# Patient Record
Sex: MALE | Race: WHITE | NOT HISPANIC OR LATINO | Employment: FULL TIME | URBAN - METROPOLITAN AREA
[De-identification: names, ages, dates, MRNs, and addresses within clinical notes are randomized per-mention and may not be internally consistent; named-entity substitution may affect disease eponyms.]

---

## 2019-07-23 ENCOUNTER — TELEPHONE (OUTPATIENT)
Dept: FAMILY MEDICINE CLINIC | Facility: CLINIC | Age: 54
End: 2019-07-23

## 2019-07-23 NOTE — TELEPHONE ENCOUNTER
Patient is needing refills on his medications and the pharmacy won't fax over his med requests to our fax number he tried giving him.  He needs his metoprolol 200 mg, his testosterone, and losartan potassium 100 mg please.  Pharmacy is [facility] in [facility].

## 2019-07-24 RX ORDER — METOPROLOL SUCCINATE 200 MG/1
200 TABLET, EXTENDED RELEASE ORAL DAILY
Qty: 30 TABLET | Refills: 2 | Status: SHIPPED | OUTPATIENT
Start: 2019-07-24 | End: 2019-11-21 | Stop reason: SDUPTHER

## 2019-07-24 RX ORDER — LOSARTAN POTASSIUM 100 MG/1
100 TABLET ORAL DAILY
Qty: 30 TABLET | Refills: 2 | Status: SHIPPED | OUTPATIENT
Start: 2019-07-24 | End: 2019-10-03 | Stop reason: SDUPTHER

## 2019-07-24 NOTE — TELEPHONE ENCOUNTER
LOV and SOTO reviewed.  The losartan and metoprolol has been sent.  Unfortunately the last OV was over 3 months ago and will need to be seen for any further testosterone prescriptions per state laws.

## 2019-08-01 ENCOUNTER — OFFICE VISIT (OUTPATIENT)
Dept: FAMILY MEDICINE CLINIC | Facility: CLINIC | Age: 54
End: 2019-08-01

## 2019-08-01 VITALS
OXYGEN SATURATION: 90 % | DIASTOLIC BLOOD PRESSURE: 82 MMHG | HEIGHT: 70 IN | TEMPERATURE: 97.9 F | WEIGHT: 315 LBS | BODY MASS INDEX: 45.1 KG/M2 | HEART RATE: 52 BPM | SYSTOLIC BLOOD PRESSURE: 140 MMHG

## 2019-08-01 DIAGNOSIS — E03.9 HYPOTHYROIDISM, UNSPECIFIED TYPE: ICD-10-CM

## 2019-08-01 DIAGNOSIS — E29.1 HYPOGONADISM MALE: ICD-10-CM

## 2019-08-01 DIAGNOSIS — I10 ESSENTIAL HYPERTENSION: Primary | ICD-10-CM

## 2019-08-01 DIAGNOSIS — F51.01 PRIMARY INSOMNIA: ICD-10-CM

## 2019-08-01 DIAGNOSIS — E66.01 OBESITY, MORBID, BMI 50 OR HIGHER (HCC): ICD-10-CM

## 2019-08-01 PROBLEM — G47.00 INSOMNIA: Status: ACTIVE | Noted: 2019-08-01

## 2019-08-01 PROBLEM — D64.9 ANEMIA: Status: ACTIVE | Noted: 2019-08-01

## 2019-08-01 PROBLEM — K21.9 GERD (GASTROESOPHAGEAL REFLUX DISEASE): Status: ACTIVE | Noted: 2019-08-01

## 2019-08-01 PROCEDURE — 99214 OFFICE O/P EST MOD 30 MIN: CPT | Performed by: INTERNAL MEDICINE

## 2019-08-01 RX ORDER — TRIAMTERENE AND HYDROCHLOROTHIAZIDE 37.5; 25 MG/1; MG/1
TABLET ORAL
COMMUNITY
Start: 2019-07-11 | End: 2019-08-01 | Stop reason: SDUPTHER

## 2019-08-01 RX ORDER — TESTOSTERONE CYPIONATE 200 MG/ML
300 INJECTION, SOLUTION INTRAMUSCULAR
Qty: 2 ML | Refills: 3 | Status: SHIPPED | OUTPATIENT
Start: 2019-08-01 | End: 2019-10-20 | Stop reason: SDUPTHER

## 2019-08-01 RX ORDER — FLUTICASONE PROPIONATE 50 MCG
2 SPRAY, SUSPENSION (ML) NASAL DAILY
Qty: 3 BOTTLE | Refills: 3 | Status: SHIPPED | OUTPATIENT
Start: 2019-08-01 | End: 2020-03-18 | Stop reason: SDUPTHER

## 2019-08-01 RX ORDER — AMLODIPINE BESYLATE 10 MG/1
10 TABLET ORAL DAILY
Qty: 90 TABLET | Refills: 1 | Status: SHIPPED | OUTPATIENT
Start: 2019-08-01 | End: 2020-02-04

## 2019-08-01 RX ORDER — TESTOSTERONE CYPIONATE 200 MG/ML
INJECTION, SOLUTION INTRAMUSCULAR
COMMUNITY
Start: 2019-06-27 | End: 2019-08-01 | Stop reason: SDUPTHER

## 2019-08-01 RX ORDER — FLUTICASONE PROPIONATE 50 MCG
2 SPRAY, SUSPENSION (ML) NASAL DAILY
COMMUNITY
End: 2019-08-01 | Stop reason: SDUPTHER

## 2019-08-01 RX ORDER — LEVOTHYROXINE SODIUM 0.1 MG/1
TABLET ORAL
COMMUNITY
Start: 2019-05-23 | End: 2019-09-12 | Stop reason: SDUPTHER

## 2019-08-01 RX ORDER — TRIAMTERENE AND HYDROCHLOROTHIAZIDE 37.5; 25 MG/1; MG/1
1 TABLET ORAL DAILY
Qty: 90 TABLET | Refills: 1 | Status: SHIPPED | OUTPATIENT
Start: 2019-08-01 | End: 2020-03-18 | Stop reason: SDUPTHER

## 2019-08-01 RX ORDER — AMLODIPINE BESYLATE 10 MG/1
TABLET ORAL
COMMUNITY
Start: 2019-07-11 | End: 2019-08-01 | Stop reason: SDUPTHER

## 2019-08-01 NOTE — PROGRESS NOTES
Subjective   Ryley Heller is a 54 y.o. male.     Chief Complaint   Patient presents with   • Hypertension   • Allergies   • Hypothyroidism   • Hypogonadism       History of Present Illness   Follow-up for her hypertension.  Currently they have been feeling well and asymptomatic without any headaches,cough, chest pain, shortness of breath, swelling, focal neurologic deficit , memory loss or syncope.  Has been taking the medications regularly and adherent with the regimen, Denies medication side effects and no significant interval events.    Follow-up for the testosterone.  Doing 1.5 ml every 3 weeks.  Has not felt different with last injection 10 days ago.  No difficulty urinating or with BM.  Last PSA April 2019.  Has been working on loosing weight and has lost 8 pounds.  Continues to take the thyroid hormones regularly.  Patient had all labs drawn 3 months ago and reported as normal/unchanged.  Has had difficulty falling asleep but once a sleep sleeps well.  No anxiety or mood issues.  The following portions of the patient's history were reviewed and updated as appropriate: allergies, current medications, past family history, past medical history, past social history, past surgical history and problem list.    Past Medical History:   Diagnosis Date   • Allergic    • Hypertension    • Hypothyroidism    • Pneumonia        Past Surgical History:   Procedure Laterality Date   • BACK SURGERY  08/2009   • SHOULDER SURGERY  10/2012       Family History   Adopted: Yes       Social History     Socioeconomic History   • Marital status: Single     Spouse name: Not on file   • Number of children: Not on file   • Years of education: Not on file   • Highest education level: Not on file   Tobacco Use   • Smoking status: Never Smoker   • Smokeless tobacco: Never Used   Substance and Sexual Activity   • Alcohol use: No     Frequency: Never   • Drug use: No       Review of Systems   Constitutional: Negative for activity change,  appetite change, unexpected weight gain and unexpected weight loss.   HENT: Negative for trouble swallowing and voice change.    Eyes: Negative for visual disturbance.   Respiratory: Negative for apnea, cough and shortness of breath.    Cardiovascular: Negative for chest pain, palpitations and leg swelling.   Gastrointestinal: Negative for abdominal pain, diarrhea, nausea, vomiting, GERD and indigestion.   Endocrine: Negative for polydipsia, polyphagia and polyuria.   Genitourinary: Negative for urinary incontinence, erectile dysfunction and urgency.   Musculoskeletal: Negative for arthralgias, back pain and myalgias.   Neurological: Negative for dizziness, tremors, weakness and light-headedness.   Hematological: Negative for adenopathy. Does not bruise/bleed easily.   Psychiatric/Behavioral: Positive for sleep disturbance. Negative for agitation and depressed mood.       Objective   Vitals:    08/01/19 1052   BP: 140/82   Pulse: 52   Temp: 97.9 °F (36.6 °C)   SpO2: 90%     Body mass index is 51.6 kg/m².  Physical Exam   Constitutional: He is oriented to person, place, and time. He appears well-developed and well-nourished.   HENT:   Head: Normocephalic and atraumatic.   Right Ear: External ear normal.   Left Ear: External ear normal.   Nose: Nose normal.   Mouth/Throat: Oropharynx is clear and moist.   Eyes: Conjunctivae and EOM are normal. Pupils are equal, round, and reactive to light.   Neck: Normal range of motion. Neck supple. No tracheal deviation present. No thyromegaly present.   Cardiovascular: Normal rate, regular rhythm and normal heart sounds. Exam reveals no gallop and no friction rub.   No murmur heard.  Pulmonary/Chest: Effort normal and breath sounds normal. No respiratory distress. He exhibits no tenderness.   Abdominal: Soft. Bowel sounds are normal. He exhibits no distension. There is no tenderness.   obese   Musculoskeletal: Normal range of motion. He exhibits no edema or tenderness.    Lymphadenopathy:     He has no cervical adenopathy.   Neurological: He is alert and oriented to person, place, and time. He displays normal reflexes. No cranial nerve deficit or sensory deficit. Coordination normal.   Skin: Skin is warm and dry.   Psychiatric: He has a normal mood and affect. His behavior is normal. Judgment and thought content normal.   Nursing note and vitals reviewed.      No results found for this or any previous visit (from the past 2016 hour(s)).  Assessment/Plan   Ryley was seen today for hypertension, allergies, hypothyroidism and hypogonadism.    Diagnoses and all orders for this visit:    Essential hypertension    Hypogonadism male    Hypothyroidism, unspecified type    Primary insomnia    Obesity, morbid, BMI 50 or higher (CMS/[facility])    Other orders  -     amLODIPine (NORVASC) 10 MG tablet; Take 1 tablet by mouth Daily.  -     triamterene-hydrochlorothiazide (MAXZIDE-25) 37.5-25 MG per tablet; Take 1 tablet by mouth Daily.  -     fluticasone (FLONASE) 50 MCG/ACT nasal spray; 2 sprays into the nostril(s) as directed by provider Daily.  -     Testosterone Cypionate (DEPOTESTOTERONE CYPIONATE) 200 MG/ML injection; Inject 1.5 mL into the appropriate muscle as directed by prescriber Every 21 (Twenty-One) Days.     Discussed the sleeping and recommend trying OTC melatonin first.  Continue the other  Medications and controlled substance agreement reviewed and completed.  SOTO run and reviewed.  Risks of the medication include but are not limited to abuse, rage, anxiety, behavior issues, etc.

## 2019-08-01 NOTE — PATIENT INSTRUCTIONS
Calorie Counting for Weight Loss  Calories are units of energy. Your body needs a certain amount of calories from food to keep you going throughout the day. When you eat more calories than your body needs, your body stores the extra calories as fat. When you eat fewer calories than your body needs, your body burns fat to get the energy it needs.  Calorie counting means keeping track of how many calories you eat and drink each day. Calorie counting can be helpful if you need to lose weight. If you make sure to eat fewer calories than your body needs, you should lose weight. Ask your health care provider what a healthy weight is for you.  For calorie counting to work, you will need to eat the right number of calories in a day in order to lose a healthy amount of weight per week. A dietitian can help you determine how many calories you need in a day and will give you suggestions on how to reach your calorie goal.  · A healthy amount of weight to lose per week is usually 1-2 lb (0.5-0.9 kg). This usually means that your daily calorie intake should be reduced by 500-750 calories.  · Eating 1,200 - 1,500 calories per day can help most women lose weight.  · Eating 1,500 - 1,800 calories per day can help most men lose weight.  What is my plan?  My goal is to have __________ calories per day.  If I have this many calories per day, I should lose around __________ pounds per week.  What do I need to know about calorie counting?  In order to meet your daily calorie goal, you will need to:  · Find out how many calories are in each food you would like to eat. Try to do this before you eat.  · Decide how much of the food you plan to eat.  · Write down what you ate and how many calories it had. Doing this is called keeping a food log.  To successfully lose weight, it is important to balance calorie counting with a healthy lifestyle that includes regular activity. Aim for 150 minutes of moderate exercise (such as walking) or 75  minutes of vigorous exercise (such as running) each week.  Where do I find calorie information?    The number of calories in a food can be found on a Nutrition Facts label. If a food does not have a Nutrition Facts label, try to look up the calories online or ask your dietitian for help.  Remember that calories are listed per serving. If you choose to have more than one serving of a food, you will have to multiply the calories per serving by the amount of servings you plan to eat. For example, the label on a package of bread might say that a serving size is 1 slice and that there are 90 calories in a serving. If you eat 1 slice, you will have eaten 90 calories. If you eat 2 slices, you will have eaten 180 calories.  How do I keep a food log?  Immediately after each meal, record the following information in your food log:  · What you ate. Don't forget to include toppings, sauces, and other extras on the food.  · How much you ate. This can be measured in cups, ounces, or number of items.  · How many calories each food and drink had.  · The total number of calories in the meal.  Keep your food log near you, such as in a small notebook in your pocket, or use a mobile shirley or website. Some programs will calculate calories for you and show you how many calories you have left for the day to meet your goal.  What are some calorie counting tips?    · Use your calories on foods and drinks that will fill you up and not leave you hungry:  ? Some examples of foods that fill you up are nuts and nut butters, vegetables, lean proteins, and high-fiber foods like whole grains. High-fiber foods are foods with more than 5 g fiber per serving.  ? Drinks such as sodas, specialty coffee drinks, alcohol, and juices have a lot of calories, yet do not fill you up.  · Eat nutritious foods and avoid empty calories. Empty calories are calories you get from foods or beverages that do not have many vitamins or protein, such as candy, sweets, and  "soda. It is better to have a nutritious high-calorie food (such as an avocado) than a food with few nutrients (such as a bag of chips).  · Know how many calories are in the foods you eat most often. This will help you calculate calorie counts faster.  · Pay attention to calories in drinks. Low-calorie drinks include water and unsweetened drinks.  · Pay attention to nutrition labels for \"low fat\" or \"fat free\" foods. These foods sometimes have the same amount of calories or more calories than the full fat versions. They also often have added sugar, starch, or salt, to make up for flavor that was removed with the fat.  · Find a way of tracking calories that works for you. Get creative. Try different apps or programs if writing down calories does not work for you.  What are some portion control tips?  · Know how many calories are in a serving. This will help you know how many servings of a certain food you can have.  · Use a measuring cup to measure serving sizes. You could also try weighing out portions on a kitchen scale. With time, you will be able to estimate serving sizes for some foods.  · Take some time to put servings of different foods on your favorite plates, bowls, and cups so you know what a serving looks like.  · Try not to eat straight from a bag or box. Doing this can lead to overeating. Put the amount you would like to eat in a cup or on a plate to make sure you are eating the right portion.  · Use smaller plates, glasses, and bowls to prevent overeating.  · Try not to multitask (for example, watch TV or use your computer) while eating. If it is time to eat, sit down at a table and enjoy your food. This will help you to know when you are full. It will also help you to be aware of what you are eating and how much you are eating.  What are tips for following this plan?  Reading food labels  · Check the calorie count compared to the serving size. The serving size may be smaller than what you are used to " eating.  · Check the source of the calories. Make sure the food you are eating is high in vitamins and protein and low in saturated and trans fats.  Shopping  · Read nutrition labels while you shop. This will help you make healthy decisions before you decide to purchase your food.  · Make a grocery list and stick to it.  Cooking  · Try to cook your favorite foods in a healthier way. For example, try baking instead of frying.  · Use low-fat dairy products.  Meal planning  · Use more fruits and vegetables. Half of your plate should be fruits and vegetables.  · Include lean proteins like poultry and fish.  How do I count calories when eating out?  · Ask for smaller portion sizes.  · Consider sharing an entree and sides instead of getting your own entree.  · If you get your own entree, eat only half. Ask for a box at the beginning of your meal and put the rest of your entree in it so you are not tempted to eat it.  · If calories are listed on the menu, choose the lower calorie options.  · Choose dishes that include vegetables, fruits, whole grains, low-fat dairy products, and lean protein.  · Choose items that are boiled, broiled, grilled, or steamed. Stay away from items that are buttered, battered, fried, or served with cream sauce. Items labeled “crispy” are usually fried, unless stated otherwise.  · Choose water, low-fat milk, unsweetened iced tea, or other drinks without added sugar. If you want an alcoholic beverage, choose a lower calorie option such as a glass of wine or light beer.  · Ask for dressings, sauces, and syrups on the side. These are usually high in calories, so you should limit the amount you eat.  · If you want a salad, choose a garden salad and ask for grilled meats. Avoid extra toppings like vega, cheese, or fried items. Ask for the dressing on the side, or ask for olive oil and vinegar or lemon to use as dressing.  · Estimate how many servings of a food you are given. For example, a serving of  cooked rice is ½ cup or about the size of half a baseball. Knowing serving sizes will help you be aware of how much food you are eating at restaurants. The list below tells you how big or small some common portion sizes are based on everyday objects:  ? 1 oz--4 stacked dice.  ? 3 oz--1 deck of cards.  ? 1 tsp--1 die.  ? 1 Tbsp--½ a ping-pong ball.  ? 2 Tbsp--1 ping-pong ball.  ? ½ cup--½ baseball.  ? 1 cup--1 baseball.  Summary  · Calorie counting means keeping track of how many calories you eat and drink each day. If you eat fewer calories than your body needs, you should lose weight.  · A healthy amount of weight to lose per week is usually 1-2 lb (0.5-0.9 kg). This usually means reducing your daily calorie intake by 500-750 calories.  · The number of calories in a food can be found on a Nutrition Facts label. If a food does not have a Nutrition Facts label, try to look up the calories online or ask your dietitian for help.  · Use your calories on foods and drinks that will fill you up, and not on foods and drinks that will leave you hungry.  · Use smaller plates, glasses, and bowls to prevent overeating.  This information is not intended to replace advice given to you by your health care provider. Make sure you discuss any questions you have with your health care provider.  Document Released: 12/18/2006 Document Revised: 11/17/2017 Document Reviewed: 11/17/2017  [facility] Interactive Patient Education © 2019 [employer] [facility] [employer]  Obesity, Adult  Obesity is having too much body fat. If you have a BMI of 30 or more, you are obese. BMI is a number that explains how much body fat you have. Obesity is often caused by taking in (consuming) more calories than your body uses.  Obesity can cause serious health problems. Changing your lifestyle can help to treat obesity.  Follow these instructions at home:  Eating and drinking    · Follow advice from your doctor about what to eat and drink. Your doctor may tell you to:  ? Cut down  on (limit) fast foods, sweets, and processed snack foods.  ? Choose low-fat options. For example, choose low-fat milk instead of whole milk.  ? Eat 5 or more servings of fruits or vegetables every day.  ? Eat at home more often. This gives you more control over what you eat.  ? Choose healthy foods when you eat out.  ? Learn what a healthy portion size is. A portion size is the amount of a certain food that is healthy for you to eat at one time. This is different for each person.  ? Keep low-fat snacks available.  ? Avoid sugary drinks. These include soda, fruit juice, iced tea that is sweetened with sugar, and flavored milk.  ? Eat a healthy breakfast.  · Drink enough water to keep your pee (urine) clear or pale yellow.  · Do not go without eating for long periods of time (do not fast).  · Do not go on popular or trendy diets (fad diets).  Physical Activity  · Exercise often, as told by your doctor. Ask your doctor:  ? What types of exercise are safe for you.  ? How often you should exercise.  · Warm up and stretch before being active.  · Do slow stretching after being active (cool down).  · Rest between times of being active.  Lifestyle  · Limit how much time you spend in front of your TV, computer, or video game system (be less sedentary).  · Find ways to reward yourself that do not involve food.  · Limit alcohol intake to no more than 1 drink a day for nonpregnant women and 2 drinks a day for men. One drink equals 12 oz of beer, 5 oz of wine, or 1½ oz of hard liquor.  General instructions  · Keep a weight loss journal. This can help you keep track of:  ? The food that you eat.  ? The exercise that you do.  · Take over-the-counter and prescription medicines only as told by your doctor.  · Take vitamins and supplements only as told by your doctor.  · Think about joining a support group. Your doctor may be able to help with this.  · Keep all follow-up visits as told by your doctor. This is important.  Contact a  doctor if:  · You cannot meet your weight loss goal after you have changed your diet and lifestyle for 6 weeks.  This information is not intended to replace advice given to you by your health care provider. Make sure you discuss any questions you have with your health care provider.  Document Released: 03/11/2013 Document Revised: 05/25/2017 Document Reviewed: 10/05/2016  [employer] Interactive Patient Education © 2019 [employer]

## 2019-09-10 ENCOUNTER — TELEPHONE (OUTPATIENT)
Dept: FAMILY MEDICINE CLINIC | Facility: CLINIC | Age: 54
End: 2019-09-10

## 2019-09-10 NOTE — TELEPHONE ENCOUNTER
Needs his Levothyroxine 100MCG sent to [facility] listed in chart, he states that [facility] has been sending a refill request

## 2019-09-12 DIAGNOSIS — E03.9 HYPOTHYROIDISM, UNSPECIFIED TYPE: Primary | ICD-10-CM

## 2019-09-12 RX ORDER — LEVOTHYROXINE SODIUM 0.1 MG/1
100 TABLET ORAL DAILY
Qty: 30 TABLET | Refills: 3 | Status: SHIPPED | OUTPATIENT
Start: 2019-09-12 | End: 2020-01-02

## 2019-10-03 DIAGNOSIS — I10 ESSENTIAL HYPERTENSION: Primary | ICD-10-CM

## 2019-10-03 RX ORDER — LOSARTAN POTASSIUM 100 MG/1
100 TABLET ORAL DAILY
Qty: 30 TABLET | Refills: 0 | Status: SHIPPED | OUTPATIENT
Start: 2019-10-03 | End: 2020-03-18 | Stop reason: SDUPTHER

## 2019-10-21 RX ORDER — TESTOSTERONE CYPIONATE 200 MG/ML
INJECTION, SOLUTION INTRAMUSCULAR
Qty: 2 ML | Refills: 3 | Status: SHIPPED | OUTPATIENT
Start: 2019-10-21 | End: 2020-01-27

## 2019-11-22 RX ORDER — METOPROLOL SUCCINATE 200 MG/1
TABLET, EXTENDED RELEASE ORAL
Qty: 30 TABLET | Refills: 3 | Status: SHIPPED | OUTPATIENT
Start: 2019-11-22 | End: 2020-03-26

## 2020-01-01 DIAGNOSIS — E03.9 HYPOTHYROIDISM, UNSPECIFIED TYPE: ICD-10-CM

## 2020-01-02 RX ORDER — LEVOTHYROXINE SODIUM 0.1 MG/1
TABLET ORAL
Qty: 30 TABLET | Refills: 0 | Status: SHIPPED | OUTPATIENT
Start: 2020-01-02 | End: 2020-02-04

## 2020-01-17 RX ORDER — TESTOSTERONE CYPIONATE 200 MG/ML
INJECTION, SOLUTION INTRAMUSCULAR
Qty: 2 ML | Refills: 2 | OUTPATIENT
Start: 2020-01-17

## 2020-01-27 DIAGNOSIS — E29.1 HYPOGONADISM MALE: Primary | ICD-10-CM

## 2020-01-27 RX ORDER — TESTOSTERONE CYPIONATE 200 MG/ML
INJECTION, SOLUTION INTRAMUSCULAR
Qty: 2 ML | Refills: 1 | Status: SHIPPED | OUTPATIENT
Start: 2020-01-27 | End: 2020-03-18 | Stop reason: SDUPTHER

## 2020-02-03 DIAGNOSIS — E03.9 HYPOTHYROIDISM, UNSPECIFIED TYPE: ICD-10-CM

## 2020-02-04 RX ORDER — LEVOTHYROXINE SODIUM 0.1 MG/1
TABLET ORAL
Qty: 30 TABLET | Refills: 0 | Status: SHIPPED | OUTPATIENT
Start: 2020-02-04 | End: 2020-03-04

## 2020-02-04 RX ORDER — AMLODIPINE BESYLATE 10 MG/1
TABLET ORAL
Qty: 30 TABLET | Refills: 0 | Status: SHIPPED | OUTPATIENT
Start: 2020-02-04 | End: 2020-03-04

## 2020-03-03 DIAGNOSIS — E03.9 HYPOTHYROIDISM, UNSPECIFIED TYPE: ICD-10-CM

## 2020-03-04 RX ORDER — AMLODIPINE BESYLATE 10 MG/1
TABLET ORAL
Qty: 30 TABLET | Refills: 0 | Status: SHIPPED | OUTPATIENT
Start: 2020-03-04 | End: 2020-03-26

## 2020-03-04 RX ORDER — LEVOTHYROXINE SODIUM 0.1 MG/1
100 TABLET ORAL DAILY
Qty: 30 TABLET | Refills: 0 | Status: SHIPPED | OUTPATIENT
Start: 2020-03-04 | End: 2020-03-26

## 2020-03-05 DIAGNOSIS — E29.1 HYPOGONADISM MALE: ICD-10-CM

## 2020-03-05 RX ORDER — TRIAMTERENE AND HYDROCHLOROTHIAZIDE 37.5; 25 MG/1; MG/1
1 TABLET ORAL DAILY
Qty: 30 TABLET | Refills: 0 | OUTPATIENT
Start: 2020-03-05

## 2020-03-05 RX ORDER — TESTOSTERONE CYPIONATE 200 MG/ML
INJECTION, SOLUTION INTRAMUSCULAR
Qty: 2 ML | Refills: 0 | OUTPATIENT
Start: 2020-03-05

## 2020-03-06 ENCOUNTER — TELEPHONE (OUTPATIENT)
Dept: FAMILY MEDICINE CLINIC | Facility: CLINIC | Age: 55
End: 2020-03-06

## 2020-03-06 NOTE — TELEPHONE ENCOUNTER
Attempted to call patient to get scheduled to come in for follow up to get his testosterone levels checked so he can get med refills. He has not been seen since August.

## 2020-03-18 ENCOUNTER — OFFICE VISIT (OUTPATIENT)
Dept: FAMILY MEDICINE CLINIC | Facility: CLINIC | Age: 55
End: 2020-03-18

## 2020-03-18 VITALS
BODY MASS INDEX: 45.1 KG/M2 | TEMPERATURE: 97.7 F | DIASTOLIC BLOOD PRESSURE: 82 MMHG | HEIGHT: 70 IN | WEIGHT: 315 LBS | HEART RATE: 56 BPM | SYSTOLIC BLOOD PRESSURE: 138 MMHG | OXYGEN SATURATION: 96 %

## 2020-03-18 DIAGNOSIS — E03.9 HYPOTHYROIDISM, UNSPECIFIED TYPE: ICD-10-CM

## 2020-03-18 DIAGNOSIS — F51.01 PRIMARY INSOMNIA: ICD-10-CM

## 2020-03-18 DIAGNOSIS — E29.1 HYPOGONADISM MALE: ICD-10-CM

## 2020-03-18 DIAGNOSIS — D64.9 ANEMIA, UNSPECIFIED TYPE: ICD-10-CM

## 2020-03-18 DIAGNOSIS — I10 ESSENTIAL HYPERTENSION: Primary | ICD-10-CM

## 2020-03-18 PROCEDURE — 99214 OFFICE O/P EST MOD 30 MIN: CPT | Performed by: INTERNAL MEDICINE

## 2020-03-18 RX ORDER — TRIAMTERENE AND HYDROCHLOROTHIAZIDE 37.5; 25 MG/1; MG/1
1 TABLET ORAL DAILY
Qty: 90 TABLET | Refills: 1 | Status: SHIPPED | OUTPATIENT
Start: 2020-03-18 | End: 2020-09-21

## 2020-03-18 RX ORDER — FLUTICASONE PROPIONATE 50 MCG
2 SPRAY, SUSPENSION (ML) NASAL DAILY
Qty: 3 BOTTLE | Refills: 3 | Status: SHIPPED | OUTPATIENT
Start: 2020-03-18 | End: 2020-12-29

## 2020-03-18 RX ORDER — DOXEPIN HYDROCHLORIDE 10 MG/1
10 CAPSULE ORAL NIGHTLY
Qty: 30 CAPSULE | Refills: 3 | Status: SHIPPED | OUTPATIENT
Start: 2020-03-18 | End: 2020-07-23

## 2020-03-18 RX ORDER — TESTOSTERONE CYPIONATE 200 MG/ML
400 INJECTION, SOLUTION INTRAMUSCULAR
Qty: 2 ML | Refills: 3 | Status: SHIPPED | OUTPATIENT
Start: 2020-03-18 | End: 2020-07-02

## 2020-03-18 RX ORDER — LOSARTAN POTASSIUM 100 MG/1
100 TABLET ORAL DAILY
Qty: 90 TABLET | Refills: 1 | Status: SHIPPED | OUTPATIENT
Start: 2020-03-18 | End: 2020-09-21

## 2020-03-18 NOTE — PROGRESS NOTES
Subjective   Ryley Heller is a 54 y.o. male.     Chief Complaint   Patient presents with   • Hypertension       History of Present Illness   Follow-up for her hypertension.  Currently they have been feeling well and asymptomatic without any headaches,cough, chest pain, shortness of breath, swelling, focal neurologic deficit , memory loss or syncope.  Has been taking the medications regularly and adherent with the regimen, Denies medication side effects and no significant interval events.    Follow-up for the testosterone.  Doing 2 ml every 3 weeks.  No difficulty urinating or with BM.  Last PSA April 2019.  Has had difficulty sleeping and has been using the melatonin 12 mg that helps a little but still not able to fall asleep for 30 min -2 hours then wakes up after 3 hours and cannot go back to sleep for several hours.  The following portions of the patient's history were reviewed and updated as appropriate: allergies, current medications, past family history, past medical history, past social history, past surgical history and problem list.    Depression Screen:  No flowsheet data found.    Past Medical History:   Diagnosis Date   • Allergic    • Allergic rhinitis    • Anemia    • BMI 50.0-59.9, adult (CMS/HCC)    • Elevated alkaline phosphatase level    • Encounter for immunization    • Fatigue    • Fever    • Gastritis    • GERD (gastroesophageal reflux disease)    • Hypertension    • Hypogonadism male    • Hypothyroidism    • Morbid obesity (CMS/HCC)    • Pneumonia    • Visit for annual health examination        Past Surgical History:   Procedure Laterality Date   • BACK SURGERY  08/2009   • COLONOSCOPY  10/2014    diverticulosis, internal hemorrhoids and polyps repeat in 3 years   • LAMINECTOMY  2011    Decompress, Facetectomy, Foraminotomy Lumbar Seg • L4-S1    • SHOULDER SURGERY  10/2012       Family History   Adopted: Yes       Social History     Socioeconomic History   • Marital status: Single     Spouse  name: Not on file   • Number of children: Not on file   • Years of education: Not on file   • Highest education level: Not on file   Tobacco Use   • Smoking status: Never Smoker   • Smokeless tobacco: Never Used   Substance and Sexual Activity   • Alcohol use: No     Frequency: Never   • Drug use: No       Current Outpatient Medications   Medication Sig Dispense Refill   • amLODIPine (NORVASC) 10 MG tablet TAKE ONE TABLET BY MOUTH DAILY 30 tablet 0   • fluticasone (FLONASE) 50 MCG/ACT nasal spray 2 sprays into the nostril(s) as directed by provider Daily. 3 bottle 3   • levothyroxine (SYNTHROID, LEVOTHROID) 100 MCG tablet Take 1 tablet by mouth Daily. Take 1 tablet by mouth daily. Take on empty stomach. 30 tablet 0   • losartan (Cozaar) 100 MG tablet Take 1 tablet by mouth Daily. 90 tablet 1   • metoprolol succinate XL (TOPROL-XL) 200 MG 24 hr tablet TAKE 1 (ONE) TABLET BY MOUTH DAILY 30 tablet 3   • Testosterone Cypionate (DEPOTESTOTERONE CYPIONATE) 200 MG/ML injection Inject 2 mL into the appropriate muscle as directed by prescriber Every 21 (Twenty-One) Days. 2 mL 3   • triamterene-hydrochlorothiazide (MAXZIDE-25) 37.5-25 MG per tablet Take 1 tablet by mouth Daily. 90 tablet 1   • doxepin (SINEquan) 10 MG capsule Take 1 capsule by mouth Every Night. 30 capsule 3     No current facility-administered medications for this visit.        Review of Systems   Constitutional: Negative for activity change, appetite change, fatigue, fever, unexpected weight gain and unexpected weight loss.   HENT: Negative for nosebleeds, rhinorrhea, trouble swallowing and voice change.    Eyes: Negative for visual disturbance.   Respiratory: Negative for cough, chest tightness, shortness of breath and wheezing.    Cardiovascular: Negative for chest pain, palpitations and leg swelling.   Gastrointestinal: Negative for abdominal pain, blood in stool, constipation, diarrhea, nausea, vomiting, GERD and indigestion.   Genitourinary: Negative  "for dysuria, frequency and hematuria.   Musculoskeletal: Negative for arthralgias, back pain and myalgias.   Skin: Negative for rash and bruise.   Neurological: Negative for dizziness, tremors, weakness, light-headedness, numbness, headache and memory problem.   Hematological: Negative for adenopathy. Does not bruise/bleed easily.   Psychiatric/Behavioral: Positive for sleep disturbance. Negative for depressed mood. The patient is not nervous/anxious.        Objective   /82 (BP Location: Left arm, Patient Position: Sitting, Cuff Size: Large Adult)   Pulse 56   Temp 97.7 °F (36.5 °C) (Oral)   Ht 177.8 cm (70\")   Wt (!) 164 kg (362 lb)   SpO2 96%   BMI 51.94 kg/m²     Physical Exam   Constitutional: He is oriented to person, place, and time. He appears well-developed and well-nourished. No distress.   HENT:   Head: Normocephalic and atraumatic.   Right Ear: External ear normal.   Left Ear: External ear normal.   Nose: Nose normal.   Mouth/Throat: Oropharynx is clear and moist.   Eyes: Pupils are equal, round, and reactive to light. Conjunctivae and EOM are normal.   Neck: Normal range of motion. Neck supple. No tracheal deviation present. No thyromegaly present.   Cardiovascular: Normal rate, regular rhythm, normal heart sounds and intact distal pulses. Exam reveals no gallop and no friction rub.   No murmur heard.  Pulmonary/Chest: Effort normal and breath sounds normal. No respiratory distress.   Abdominal: Soft. Bowel sounds are normal. He exhibits no mass. There is no tenderness. There is no guarding.   Musculoskeletal: Normal range of motion. He exhibits no edema.   Lymphadenopathy:     He has no cervical adenopathy.   Neurological: He is alert and oriented to person, place, and time. He displays normal reflexes. He exhibits normal muscle tone.   Skin: Skin is warm and dry. Capillary refill takes less than 2 seconds. No rash noted. He is not diaphoretic.   Psychiatric: He has a normal mood and affect. " His behavior is normal. Judgment and thought content normal.   Nursing note and vitals reviewed.      No results found for this or any previous visit (from the past 2016 hour(s)).  Assessment/Plan   Ryley was seen today for hypertension.    Diagnoses and all orders for this visit:    Essential hypertension  -     triamterene-hydrochlorothiazide (MAXZIDE-25) 37.5-25 MG per tablet; Take 1 tablet by mouth Daily.  -     losartan (Cozaar) 100 MG tablet; Take 1 tablet by mouth Daily.  -     Comprehensive Metabolic Panel; Future  -     Lipid Panel; Future    Hypothyroidism, unspecified type  -     TSH; Future  -     T4, Free; Future    Hypogonadism male  -     Testosterone Cypionate (DEPOTESTOTERONE CYPIONATE) 200 MG/ML injection; Inject 2 mL into the appropriate muscle as directed by prescriber Every 21 (Twenty-One) Days.  -     CBC & Differential; Future  -     PSA Screen; Future    Primary insomnia  -     doxepin (SINEquan) 10 MG capsule; Take 1 capsule by mouth Every Night.    Anemia, unspecified type  -     CBC & Differential; Future    Other orders  -     fluticasone (FLONASE) 50 MCG/ACT nasal spray; 2 sprays into the nostril(s) as directed by provider Daily.      Patient blood pressure is now currently doing well.  Continue current medications unchanged we will check the CMP and lipid panel today.  Patient with  Hypothyroidism but has not had levels checked in quite a long time we will schedule for his TSH and T4.  History of hypogonadism will be due in April for his PSA therefore we will do all of his blood tests at the same time including CBC levels.  Patient with new onset difficulties with insomnia including both initiation and maintenance issues will initiate doxepin 10 mg once nightly.

## 2020-03-26 DIAGNOSIS — E03.9 HYPOTHYROIDISM, UNSPECIFIED TYPE: ICD-10-CM

## 2020-03-26 RX ORDER — AMLODIPINE BESYLATE 10 MG/1
TABLET ORAL
Qty: 30 TABLET | Refills: 0 | Status: SHIPPED | OUTPATIENT
Start: 2020-03-26 | End: 2020-05-04

## 2020-03-26 RX ORDER — LEVOTHYROXINE SODIUM 0.1 MG/1
TABLET ORAL
Qty: 30 TABLET | Refills: 0 | Status: SHIPPED | OUTPATIENT
Start: 2020-03-26 | End: 2020-05-04

## 2020-03-26 RX ORDER — METOPROLOL SUCCINATE 200 MG/1
TABLET, EXTENDED RELEASE ORAL
Qty: 30 TABLET | Refills: 2 | Status: SHIPPED | OUTPATIENT
Start: 2020-03-26 | End: 2020-07-06

## 2020-04-20 ENCOUNTER — RESULTS ENCOUNTER (OUTPATIENT)
Dept: FAMILY MEDICINE CLINIC | Facility: CLINIC | Age: 55
End: 2020-04-20

## 2020-04-20 DIAGNOSIS — E29.1 HYPOGONADISM MALE: ICD-10-CM

## 2020-04-20 DIAGNOSIS — E03.9 HYPOTHYROIDISM, UNSPECIFIED TYPE: ICD-10-CM

## 2020-04-20 DIAGNOSIS — I10 ESSENTIAL HYPERTENSION: ICD-10-CM

## 2020-04-20 DIAGNOSIS — D64.9 ANEMIA, UNSPECIFIED TYPE: ICD-10-CM

## 2020-04-21 LAB
ALBUMIN SERPL-MCNC: 4 G/DL (ref 3.5–5.2)
ALBUMIN/GLOB SERPL: 1.3 G/DL
ALP SERPL-CCNC: 135 U/L (ref 39–117)
ALT SERPL-CCNC: 21 U/L (ref 1–41)
AST SERPL-CCNC: 12 U/L (ref 1–40)
BASOPHILS # BLD AUTO: ABNORMAL 10*3/UL
BASOPHILS # BLD MANUAL: 0.06 10*3/MM3 (ref 0–0.2)
BASOPHILS NFR BLD MANUAL: 1 % (ref 0–1.5)
BILIRUB SERPL-MCNC: 1 MG/DL (ref 0.2–1.2)
BUN SERPL-MCNC: 21 MG/DL (ref 6–20)
BUN/CREAT SERPL: 13.5 (ref 7–25)
CALCIUM SERPL-MCNC: 9 MG/DL (ref 8.6–10.5)
CHLORIDE SERPL-SCNC: 97 MMOL/L (ref 98–107)
CHOLEST SERPL-MCNC: 130 MG/DL (ref 0–200)
CO2 SERPL-SCNC: 27.7 MMOL/L (ref 22–29)
CREAT SERPL-MCNC: 1.56 MG/DL (ref 0.76–1.27)
DIFFERENTIAL COMMENT: NORMAL
EOSINOPHIL # BLD AUTO: ABNORMAL 10*3/UL
EOSINOPHIL # BLD MANUAL: 0.06 10*3/MM3 (ref 0–0.4)
EOSINOPHIL NFR BLD AUTO: ABNORMAL %
EOSINOPHIL NFR BLD MANUAL: 1 % (ref 0.3–6.2)
ERYTHROCYTE [DISTWIDTH] IN BLOOD BY AUTOMATED COUNT: 13.1 % (ref 12.3–15.4)
GLOBULIN SER CALC-MCNC: 3 GM/DL
GLUCOSE SERPL-MCNC: 113 MG/DL (ref 65–99)
HCT VFR BLD AUTO: 52 % (ref 37.5–51)
HDLC SERPL-MCNC: 35 MG/DL (ref 40–60)
HGB BLD-MCNC: 17.4 G/DL (ref 13–17.7)
LDLC SERPL CALC-MCNC: 74 MG/DL (ref 0–100)
LYMPHOCYTES # BLD AUTO: ABNORMAL 10*3/UL
LYMPHOCYTES # BLD MANUAL: 1.8 10*3/MM3 (ref 0.7–3.1)
LYMPHOCYTES NFR BLD AUTO: ABNORMAL %
LYMPHOCYTES NFR BLD MANUAL: 30.3 % (ref 19.6–45.3)
MCH RBC QN AUTO: 29.5 PG (ref 26.6–33)
MCHC RBC AUTO-ENTMCNC: 33.5 G/DL (ref 31.5–35.7)
MCV RBC AUTO: 88.1 FL (ref 79–97)
MONOCYTES # BLD MANUAL: 0.36 10*3/MM3 (ref 0.1–0.9)
MONOCYTES NFR BLD AUTO: ABNORMAL %
MONOCYTES NFR BLD MANUAL: 6.1 % (ref 5–12)
NEUTROPHILS # BLD MANUAL: 3.59 10*3/MM3 (ref 1.7–7)
NEUTROPHILS NFR BLD AUTO: ABNORMAL %
NEUTROPHILS NFR BLD MANUAL: 60.6 % (ref 42.7–76)
PLATELET # BLD AUTO: 124 10*3/MM3 (ref 140–450)
PLATELET BLD QL SMEAR: NORMAL
POTASSIUM SERPL-SCNC: 5.1 MMOL/L (ref 3.5–5.2)
PROT SERPL-MCNC: 7 G/DL (ref 6–8.5)
PSA SERPL-MCNC: 0.25 NG/ML (ref 0–4)
RBC # BLD AUTO: 5.9 10*6/MM3 (ref 4.14–5.8)
RBC MORPH BLD: NORMAL
SODIUM SERPL-SCNC: 137 MMOL/L (ref 136–145)
T4 FREE SERPL-MCNC: 0.91 NG/DL (ref 0.93–1.7)
TRIGL SERPL-MCNC: 104 MG/DL (ref 0–150)
TSH SERPL DL<=0.005 MIU/L-ACNC: 3.18 UIU/ML (ref 0.27–4.2)
VLDLC SERPL CALC-MCNC: 20.8 MG/DL (ref 5–40)
WBC # BLD AUTO: 5.93 10*3/MM3 (ref 3.4–10.8)

## 2020-05-02 DIAGNOSIS — E03.9 HYPOTHYROIDISM, UNSPECIFIED TYPE: ICD-10-CM

## 2020-05-04 RX ORDER — AMLODIPINE BESYLATE 10 MG/1
TABLET ORAL
Qty: 30 TABLET | Refills: 0 | Status: SHIPPED | OUTPATIENT
Start: 2020-05-04 | End: 2020-06-08

## 2020-05-04 RX ORDER — LEVOTHYROXINE SODIUM 0.1 MG/1
TABLET ORAL
Qty: 30 TABLET | Refills: 0 | Status: SHIPPED | OUTPATIENT
Start: 2020-05-04 | End: 2020-06-08

## 2020-06-05 DIAGNOSIS — E03.9 HYPOTHYROIDISM, UNSPECIFIED TYPE: ICD-10-CM

## 2020-06-08 RX ORDER — LEVOTHYROXINE SODIUM 0.1 MG/1
TABLET ORAL
Qty: 30 TABLET | Refills: 0 | Status: SHIPPED | OUTPATIENT
Start: 2020-06-08 | End: 2020-07-06

## 2020-06-08 RX ORDER — AMLODIPINE BESYLATE 10 MG/1
TABLET ORAL
Qty: 30 TABLET | Refills: 0 | Status: SHIPPED | OUTPATIENT
Start: 2020-06-08 | End: 2020-07-06

## 2020-07-02 DIAGNOSIS — E29.1 HYPOGONADISM MALE: ICD-10-CM

## 2020-07-02 RX ORDER — TESTOSTERONE CYPIONATE 200 MG/ML
INJECTION, SOLUTION INTRAMUSCULAR
Qty: 2 ML | Refills: 2 | Status: SHIPPED | OUTPATIENT
Start: 2020-07-02 | End: 2020-09-11 | Stop reason: SDUPTHER

## 2020-07-02 NOTE — TELEPHONE ENCOUNTER
We will okay 1 time but then will need to schedule for follow-up before he needs any further refills thereafter.

## 2020-07-04 DIAGNOSIS — E03.9 HYPOTHYROIDISM, UNSPECIFIED TYPE: ICD-10-CM

## 2020-07-06 RX ORDER — LEVOTHYROXINE SODIUM 0.1 MG/1
TABLET ORAL
Qty: 30 TABLET | Refills: 0 | Status: SHIPPED | OUTPATIENT
Start: 2020-07-06 | End: 2020-08-04

## 2020-07-06 RX ORDER — METOPROLOL SUCCINATE 200 MG/1
TABLET, EXTENDED RELEASE ORAL
Qty: 30 TABLET | Refills: 1 | Status: SHIPPED | OUTPATIENT
Start: 2020-07-06 | End: 2020-09-03

## 2020-07-06 RX ORDER — AMLODIPINE BESYLATE 10 MG/1
TABLET ORAL
Qty: 30 TABLET | Refills: 0 | Status: SHIPPED | OUTPATIENT
Start: 2020-07-06 | End: 2020-08-04

## 2020-07-23 DIAGNOSIS — F51.01 PRIMARY INSOMNIA: ICD-10-CM

## 2020-07-23 RX ORDER — DOXEPIN HYDROCHLORIDE 10 MG/1
CAPSULE ORAL
Qty: 30 CAPSULE | Refills: 2 | Status: SHIPPED | OUTPATIENT
Start: 2020-07-23 | End: 2020-12-18

## 2020-08-03 DIAGNOSIS — E03.9 HYPOTHYROIDISM, UNSPECIFIED TYPE: ICD-10-CM

## 2020-08-04 RX ORDER — LEVOTHYROXINE SODIUM 0.1 MG/1
TABLET ORAL
Qty: 30 TABLET | Refills: 0 | Status: SHIPPED | OUTPATIENT
Start: 2020-08-04 | End: 2020-09-03

## 2020-08-04 RX ORDER — AMLODIPINE BESYLATE 10 MG/1
TABLET ORAL
Qty: 30 TABLET | Refills: 0 | Status: SHIPPED | OUTPATIENT
Start: 2020-08-04 | End: 2020-09-03

## 2020-09-02 DIAGNOSIS — E03.9 HYPOTHYROIDISM, UNSPECIFIED TYPE: ICD-10-CM

## 2020-09-02 DIAGNOSIS — E29.1 HYPOGONADISM MALE: ICD-10-CM

## 2020-09-03 RX ORDER — AMLODIPINE BESYLATE 10 MG/1
TABLET ORAL
Qty: 30 TABLET | Refills: 0 | Status: SHIPPED | OUTPATIENT
Start: 2020-09-03 | End: 2020-10-05

## 2020-09-03 RX ORDER — LEVOTHYROXINE SODIUM 0.1 MG/1
TABLET ORAL
Qty: 30 TABLET | Refills: 0 | Status: SHIPPED | OUTPATIENT
Start: 2020-09-03 | End: 2020-10-05

## 2020-09-03 RX ORDER — METOPROLOL SUCCINATE 200 MG/1
TABLET, EXTENDED RELEASE ORAL
Qty: 30 TABLET | Refills: 0 | Status: SHIPPED | OUTPATIENT
Start: 2020-09-03 | End: 2020-10-05

## 2020-09-08 RX ORDER — TESTOSTERONE CYPIONATE 200 MG/ML
INJECTION, SOLUTION INTRAMUSCULAR
Qty: 2 ML | Refills: 1 | OUTPATIENT
Start: 2020-09-08

## 2020-09-11 ENCOUNTER — OFFICE VISIT (OUTPATIENT)
Dept: FAMILY MEDICINE CLINIC | Facility: CLINIC | Age: 55
End: 2020-09-11

## 2020-09-11 VITALS
OXYGEN SATURATION: 99 % | BODY MASS INDEX: 45.1 KG/M2 | SYSTOLIC BLOOD PRESSURE: 124 MMHG | TEMPERATURE: 97.9 F | HEIGHT: 70 IN | HEART RATE: 59 BPM | WEIGHT: 315 LBS | DIASTOLIC BLOOD PRESSURE: 82 MMHG

## 2020-09-11 DIAGNOSIS — E03.9 HYPOTHYROIDISM, UNSPECIFIED TYPE: ICD-10-CM

## 2020-09-11 DIAGNOSIS — D64.9 ANEMIA, UNSPECIFIED TYPE: ICD-10-CM

## 2020-09-11 DIAGNOSIS — I10 ESSENTIAL HYPERTENSION: Primary | ICD-10-CM

## 2020-09-11 DIAGNOSIS — E29.1 HYPOGONADISM MALE: ICD-10-CM

## 2020-09-11 PROCEDURE — 99214 OFFICE O/P EST MOD 30 MIN: CPT | Performed by: FAMILY MEDICINE

## 2020-09-11 RX ORDER — TESTOSTERONE CYPIONATE 200 MG/ML
200 INJECTION, SOLUTION INTRAMUSCULAR
Qty: 2 ML | Refills: 2 | Status: SHIPPED | OUTPATIENT
Start: 2020-09-11 | End: 2021-02-08

## 2020-09-11 NOTE — PROGRESS NOTES
Chief Complaint   Patient presents with   • Med Refill     Pt is here for refills on testosterone and thyroid medication. He's also needing labs        Subjective   Ryley Heller is a 55 y.o. male.     History of Present Illness   PT is here for refills, labs and is a new pt to me.  Hypertension-no chest pain, blurry vision, headaches or palpitations.  Anemia- no fatigue   Hypogonadism- need refills he has lost 43 lbs since June actively. Last shot was about 2 weeks ago.  Hypothyroidism- Stable and No significant weight change.  Denies heat or cold intolerance.  No palpitations.      The following portions of the patient's history were reviewed and updated as appropriate: allergies, current medications, past family history, past medical history, past social history, past surgical history and problem list.    Review of Systems   Constitutional: Negative for fatigue.   Eyes: Negative for blurred vision.   Respiratory: Negative for cough, chest tightness and shortness of breath.    Cardiovascular: Negative for chest pain, palpitations and leg swelling.   Neurological: Negative for dizziness, light-headedness and headache.       Patient Active Problem List   Diagnosis   • Anemia   • GERD (gastroesophageal reflux disease)   • Hypogonadism male   • Hypothyroidism   • Hypertension   • Insomnia   • Obesity, morbid, BMI 50 or higher (CMS/[facility])       Allergies   Allergen Reactions   • Morphine Other (See Comments)     Patient felt heat sensation         Current Outpatient Medications:   •  amLODIPine (NORVASC) 10 MG tablet, TAKE ONE TABLET BY MOUTH DAILY, Disp: 30 tablet, Rfl: 0  •  doxepin (SINEquan) 10 MG capsule, TAKE ONE CAPSULE BY MOUTH ONCE NIGHTLY, Disp: 30 capsule, Rfl: 2  •  fluticasone (FLONASE) 50 MCG/ACT nasal spray, 2 sprays into the nostril(s) as directed by provider Daily., Disp: 3 bottle, Rfl: 3  •  levothyroxine (SYNTHROID, LEVOTHROID) 100 MCG tablet, TAKE ONE TABLET BY MOUTH DAILY TAKE ON EMPTY STOMACH,  "Disp: 30 tablet, Rfl: 0  •  losartan (Cozaar) 100 MG tablet, Take 1 tablet by mouth Daily., Disp: 90 tablet, Rfl: 1  •  metoprolol succinate XL (TOPROL-XL) 200 MG 24 hr tablet, TAKE ONE TABLET BY MOUTH DAILY, Disp: 30 tablet, Rfl: 0  •  Testosterone Cypionate (DEPOTESTOTERONE CYPIONATE) 200 MG/ML injection, Inject 1 mL into the appropriate muscle as directed by prescriber Every 21 (Twenty-One) Days., Disp: 2 mL, Rfl: 2  •  triamterene-hydrochlorothiazide (MAXZIDE-25) 37.5-25 MG per tablet, Take 1 tablet by mouth Daily., Disp: 90 tablet, Rfl: 1    Past Medical History:   Diagnosis Date   • Allergic    • Allergic rhinitis    • Anemia    • BMI 50.0-59.9, adult (CMS/HCC)    • Elevated alkaline phosphatase level    • Encounter for immunization    • Fatigue    • Fever    • Gastritis    • GERD (gastroesophageal reflux disease)    • Hypertension    • Hypogonadism male    • Hypothyroidism    • Morbid obesity (CMS/HCC)    • Pneumonia    • Visit for annual health examination        Past Surgical History:   Procedure Laterality Date   • BACK SURGERY  08/2009   • COLONOSCOPY  10/2014    diverticulosis, internal hemorrhoids and polyps repeat in 3 years   • LAMINECTOMY  2011    Decompress, Facetectomy, Foraminotomy Lumbar Seg • L4-S1    • SHOULDER SURGERY  10/2012       Family History   Adopted: Yes       Social History     Tobacco Use   • Smoking status: Never Smoker   • Smokeless tobacco: Never Used   Substance Use Topics   • Alcohol use: No     Frequency: Never            Objective     Visit Vitals  /82   Pulse 59   Temp 97.9 °F (36.6 °C)   Ht 177.8 cm (70\")   Wt (!) 147 kg (324 lb)   SpO2 99%   BMI 46.49 kg/m²       Physical Exam   Constitutional: He appears well-developed and well-nourished. No distress.   HENT:   Head: Normocephalic and atraumatic.   Eyes: Conjunctivae and lids are normal.   Neck: Trachea normal. Carotid bruit is not present. No thyroid mass and no thyromegaly present.   Cardiovascular: Normal rate, " regular rhythm and normal heart sounds. Exam reveals no friction rub.   No murmur heard.  Pulmonary/Chest: Effort normal and breath sounds normal. No stridor. No respiratory distress. He has no wheezes. He has no rales.   Lymphadenopathy:     He has no cervical adenopathy.   Neurological: He is alert. He is not disoriented.   Skin: Skin is warm and dry.   Psychiatric: He has a normal mood and affect. His speech is normal and behavior is normal. He is attentive.   Nursing note and vitals reviewed.      Lab Results   Component Value Date    BUN 21 (H) 04/20/2020    CREATININE 1.56 (H) 04/20/2020    EGFRIFNONA 47 (L) 04/20/2020    EGFRIFAFRI 56 (L) 04/20/2020    BCR 13.5 04/20/2020    K 5.1 04/20/2020    CO2 27.7 04/20/2020    CALCIUM 9.0 04/20/2020    PROTENTOTREF 7.0 04/20/2020    ALBUMIN 4.00 04/20/2020    LABIL2 1.3 04/20/2020    AST 12 04/20/2020    ALT 21 04/20/2020       WBC   Date Value Ref Range Status   04/20/2020 5.93 3.40 - 10.80 10*3/mm3 Final     RBC   Date Value Ref Range Status   04/20/2020 5.90 (H) 4.14 - 5.80 10*6/mm3 Final     Hemoglobin   Date Value Ref Range Status   04/20/2020 17.4 13.0 - 17.7 g/dL Final     Hematocrit   Date Value Ref Range Status   04/20/2020 52.0 (H) 37.5 - 51.0 % Final     MCV   Date Value Ref Range Status   04/20/2020 88.1 79.0 - 97.0 fL Final     MCH   Date Value Ref Range Status   04/20/2020 29.5 26.6 - 33.0 pg Final     MCHC   Date Value Ref Range Status   04/20/2020 33.5 31.5 - 35.7 g/dL Final     RDW   Date Value Ref Range Status   04/20/2020 13.1 12.3 - 15.4 % Final     Platelets   Date Value Ref Range Status   04/20/2020 124 (L) 140 - 450 10*3/mm3 Final     Neutrophil Rel %   Date Value Ref Range Status   04/20/2020 CANCELED       Comment:     Test not performed    Result canceled by the ancillary.       Lymphocyte Rel %   Date Value Ref Range Status   04/20/2020 CANCELED       Comment:     Test not performed    Result canceled by the ancillary.       Monocyte Rel %    Date Value Ref Range Status   04/20/2020 CANCELED       Comment:     Test not performed    Result canceled by the ancillary.       Eosinophil Rel %   Date Value Ref Range Status   04/20/2020 CANCELED       Comment:     Test not performed    Result canceled by the ancillary.     04/20/2020 1.0 0.3 - 6.2 % Final     Neutrophils Absolute   Date Value Ref Range Status   04/20/2020 3.59 1.70 - 7.00 10*3/mm3 Final     Lymphocytes Absolute   Date Value Ref Range Status   04/20/2020 CANCELED       Comment:     Test not performed    Result canceled by the ancillary.     04/20/2020 1.80 0.70 - 3.10 10*3/mm3 Final     Monocytes Absolute   Date Value Ref Range Status   04/20/2020 0.36 0.10 - 0.90 10*3/mm3 Final     Eosinophils Absolute   Date Value Ref Range Status   04/20/2020 CANCELED       Comment:     Test not performed    Result canceled by the ancillary.       Eosinophil Abs   Date Value Ref Range Status   04/20/2020 0.06 0.00 - 0.40 10*3/mm3 Final     Basophils Absolute   Date Value Ref Range Status   04/20/2020 CANCELED       Comment:     Test not performed    Result canceled by the ancillary.     04/20/2020 0.06 0.00 - 0.20 10*3/mm3 Final       No results found for: HGBA1C    No results found for: [id number]    TSH   Date Value Ref Range Status   04/20/2020 3.180 0.270 - 4.200 uIU/mL Final     Comment:     Results may be falsely decreased if patient taking Biotin       No results found for: CHOL  Lab Results   Component Value Date    TRIG 104 04/20/2020     Lab Results   Component Value Date    HDL 35 (L) 04/20/2020     Lab Results   Component Value Date    LDL 74 04/20/2020     Lab Results   Component Value Date    VLDL 20.8 04/20/2020     No results found for: LDLHDL      Procedures    Assessment/Plan   Problems Addressed this Visit        Cardiovascular and Mediastinum    Hypertension - Primary    Relevant Orders    TSH    Basic Metabolic Panel       Endocrine    Hypogonadism male    Relevant Medications     Testosterone Cypionate (DEPOTESTOTERONE CYPIONATE) 200 MG/ML injection    Other Relevant Orders    CBC & Differential    Testosterone (Free & Total), LC / MS    Hypothyroidism       Hematopoietic and Hemostatic    Anemia    Relevant Orders    CBC & Differential          Orders Placed This Encounter   Procedures   • Testosterone (Free & Total), LC / MS   • TSH   • Basic Metabolic Panel   • CBC & Differential     Order Specific Question:   Manual Differential     Answer:   No       Current Outpatient Medications   Medication Sig Dispense Refill   • amLODIPine (NORVASC) 10 MG tablet TAKE ONE TABLET BY MOUTH DAILY 30 tablet 0   • doxepin (SINEquan) 10 MG capsule TAKE ONE CAPSULE BY MOUTH ONCE NIGHTLY 30 capsule 2   • fluticasone (FLONASE) 50 MCG/ACT nasal spray 2 sprays into the nostril(s) as directed by provider Daily. 3 bottle 3   • levothyroxine (SYNTHROID, LEVOTHROID) 100 MCG tablet TAKE ONE TABLET BY MOUTH DAILY TAKE ON EMPTY STOMACH 30 tablet 0   • losartan (Cozaar) 100 MG tablet Take 1 tablet by mouth Daily. 90 tablet 1   • metoprolol succinate XL (TOPROL-XL) 200 MG 24 hr tablet TAKE ONE TABLET BY MOUTH DAILY 30 tablet 0   • Testosterone Cypionate (DEPOTESTOTERONE CYPIONATE) 200 MG/ML injection Inject 1 mL into the appropriate muscle as directed by prescriber Every 21 (Twenty-One) Days. 2 mL 2   • triamterene-hydrochlorothiazide (MAXZIDE-25) 37.5-25 MG per tablet Take 1 tablet by mouth Daily. 90 tablet 1     No current facility-administered medications for this visit.        No follow-ups on file.    There are no Patient Instructions on file for this visit.     refills, labs and   SOTO RUN  and reviewed.  Risks of the medication include but are not limited to fatigue, somnolence, increased risk of falls, constipation, allergic reaction, dependence, and addiction.  Also, emphasized not taking any illicit substances.  Patient is aware and acknowledges information given. Medication refilled.          Answers for  HPI/ROS submitted by the patient on 9/11/2020   What is the primary reason for your visit?: Other  Please describe your symptoms.: I am here for medication refills and blood work has to be done.  Have you had these symptoms before?: No  How long have you been having these symptoms?: 1-4 days  Please list any medications you are currently taking for this condition.: Testosterone

## 2020-09-19 LAB
BASOPHILS # BLD AUTO: 0 X10E3/UL (ref 0–0.2)
BASOPHILS NFR BLD AUTO: 1 %
BUN SERPL-MCNC: 38 MG/DL (ref 6–24)
BUN/CREAT SERPL: 21 (ref 9–20)
CALCIUM SERPL-MCNC: 9.4 MG/DL (ref 8.7–10.2)
CHLORIDE SERPL-SCNC: 99 MMOL/L (ref 96–106)
CO2 SERPL-SCNC: 24 MMOL/L (ref 20–29)
CREAT SERPL-MCNC: 1.83 MG/DL (ref 0.76–1.27)
EOSINOPHIL # BLD AUTO: 0.2 X10E3/UL (ref 0–0.4)
EOSINOPHIL NFR BLD AUTO: 3 %
ERYTHROCYTE [DISTWIDTH] IN BLOOD BY AUTOMATED COUNT: 13.8 % (ref 11.6–15.4)
GLUCOSE SERPL-MCNC: 97 MG/DL (ref 65–99)
HCT VFR BLD AUTO: 55.1 % (ref 37.5–51)
HGB BLD-MCNC: 18.6 G/DL (ref 13–17.7)
IMM GRANULOCYTES # BLD AUTO: 0 X10E3/UL (ref 0–0.1)
IMM GRANULOCYTES NFR BLD AUTO: 0 %
LYMPHOCYTES # BLD AUTO: 1.5 X10E3/UL (ref 0.7–3.1)
LYMPHOCYTES NFR BLD AUTO: 22 %
MCH RBC QN AUTO: 30 PG (ref 26.6–33)
MCHC RBC AUTO-ENTMCNC: 33.8 G/DL (ref 31.5–35.7)
MCV RBC AUTO: 89 FL (ref 79–97)
MONOCYTES # BLD AUTO: 0.6 X10E3/UL (ref 0.1–0.9)
MONOCYTES NFR BLD AUTO: 9 %
NEUTROPHILS # BLD AUTO: 4.4 X10E3/UL (ref 1.4–7)
NEUTROPHILS NFR BLD AUTO: 65 %
PLATELET # BLD AUTO: 154 X10E3/UL (ref 150–450)
POTASSIUM SERPL-SCNC: 4.9 MMOL/L (ref 3.5–5.2)
RBC # BLD AUTO: 6.19 X10E6/UL (ref 4.14–5.8)
SODIUM SERPL-SCNC: 137 MMOL/L (ref 134–144)
TESTOST FREE SERPL-MCNC: 10.2 PG/ML (ref 7.2–24)
TESTOST SERPL-MCNC: 584.8 NG/DL (ref 264–916)
TSH SERPL DL<=0.005 MIU/L-ACNC: 2.43 UIU/ML (ref 0.45–4.5)
WBC # BLD AUTO: 6.7 X10E3/UL (ref 3.4–10.8)

## 2020-09-20 DIAGNOSIS — I10 ESSENTIAL HYPERTENSION: ICD-10-CM

## 2020-09-21 PROBLEM — D58.2 ELEVATED HEMOGLOBIN (HCC): Status: ACTIVE | Noted: 2020-09-21

## 2020-09-21 PROBLEM — N18.30 CHRONIC RENAL INSUFFICIENCY, STAGE 3 (MODERATE): Status: ACTIVE | Noted: 2020-09-21

## 2020-09-21 RX ORDER — TRIAMTERENE AND HYDROCHLOROTHIAZIDE 37.5; 25 MG/1; MG/1
TABLET ORAL
Qty: 90 TABLET | Refills: 0 | Status: SHIPPED | OUTPATIENT
Start: 2020-09-21 | End: 2020-12-18

## 2020-09-21 RX ORDER — LOSARTAN POTASSIUM 100 MG/1
TABLET ORAL
Qty: 90 TABLET | Refills: 0 | Status: SHIPPED | OUTPATIENT
Start: 2020-09-21 | End: 2020-12-18

## 2020-09-23 ENCOUNTER — TELEPHONE (OUTPATIENT)
Dept: FAMILY MEDICINE CLINIC | Facility: CLINIC | Age: 55
End: 2020-09-23

## 2020-09-23 NOTE — TELEPHONE ENCOUNTER
Spoke to the patient regarding his lab results. He is ok with the decrease in testosterone dosage, but wants to know how he should go about getting the injections. He said he just picked up a new rx, and is about a week 1/2 out from needing his next shot. I advised he'd get 1 ml instead of 2, but he wants to know if he should wait a little longer before coming back in for an injection.

## 2020-10-02 DIAGNOSIS — E03.9 HYPOTHYROIDISM, UNSPECIFIED TYPE: ICD-10-CM

## 2020-10-05 RX ORDER — LEVOTHYROXINE SODIUM 0.1 MG/1
TABLET ORAL
Qty: 30 TABLET | Refills: 3 | Status: SHIPPED | OUTPATIENT
Start: 2020-10-05 | End: 2021-01-18 | Stop reason: SDUPTHER

## 2020-10-05 RX ORDER — AMLODIPINE BESYLATE 10 MG/1
TABLET ORAL
Qty: 30 TABLET | Refills: 3 | Status: SHIPPED | OUTPATIENT
Start: 2020-10-05 | End: 2021-01-18 | Stop reason: SDUPTHER

## 2020-10-05 RX ORDER — METOPROLOL SUCCINATE 200 MG/1
TABLET, EXTENDED RELEASE ORAL
Qty: 30 TABLET | Refills: 2 | Status: SHIPPED | OUTPATIENT
Start: 2020-10-05 | End: 2020-12-29

## 2020-12-17 DIAGNOSIS — F51.01 PRIMARY INSOMNIA: ICD-10-CM

## 2020-12-17 DIAGNOSIS — I10 ESSENTIAL HYPERTENSION: ICD-10-CM

## 2020-12-18 RX ORDER — DOXEPIN HYDROCHLORIDE 10 MG/1
CAPSULE ORAL
Qty: 30 CAPSULE | Refills: 2 | Status: SHIPPED | OUTPATIENT
Start: 2020-12-18 | End: 2021-01-18

## 2020-12-18 RX ORDER — TRIAMTERENE AND HYDROCHLOROTHIAZIDE 37.5; 25 MG/1; MG/1
TABLET ORAL
Qty: 90 TABLET | Refills: 0 | Status: SHIPPED | OUTPATIENT
Start: 2020-12-18 | End: 2020-12-28

## 2020-12-18 RX ORDER — LOSARTAN POTASSIUM 100 MG/1
TABLET ORAL
Qty: 90 TABLET | Refills: 0 | Status: SHIPPED | OUTPATIENT
Start: 2020-12-18 | End: 2020-12-28

## 2020-12-28 DIAGNOSIS — I10 ESSENTIAL HYPERTENSION: ICD-10-CM

## 2020-12-28 RX ORDER — LOSARTAN POTASSIUM 100 MG/1
TABLET ORAL
Qty: 90 TABLET | Refills: 0 | Status: SHIPPED | OUTPATIENT
Start: 2020-12-28 | End: 2021-07-01 | Stop reason: SDUPTHER

## 2020-12-28 RX ORDER — TRIAMTERENE AND HYDROCHLOROTHIAZIDE 37.5; 25 MG/1; MG/1
TABLET ORAL
Qty: 90 TABLET | Refills: 0 | Status: SHIPPED | OUTPATIENT
Start: 2020-12-28 | End: 2021-07-01 | Stop reason: SDUPTHER

## 2020-12-29 RX ORDER — FLUTICASONE PROPIONATE 50 MCG
SPRAY, SUSPENSION (ML) NASAL
Qty: 16 G | Refills: 6 | Status: SHIPPED | OUTPATIENT
Start: 2020-12-29 | End: 2021-07-01 | Stop reason: SDUPTHER

## 2020-12-29 RX ORDER — METOPROLOL SUCCINATE 200 MG/1
TABLET, EXTENDED RELEASE ORAL
Qty: 30 TABLET | Refills: 4 | Status: SHIPPED | OUTPATIENT
Start: 2020-12-29 | End: 2021-02-01

## 2021-01-07 DIAGNOSIS — E29.1 HYPOGONADISM MALE: ICD-10-CM

## 2021-01-08 RX ORDER — TESTOSTERONE CYPIONATE 200 MG/ML
INJECTION, SOLUTION INTRAMUSCULAR
Refills: 1 | OUTPATIENT
Start: 2021-01-08

## 2021-01-18 ENCOUNTER — OFFICE VISIT (OUTPATIENT)
Dept: FAMILY MEDICINE CLINIC | Facility: CLINIC | Age: 56
End: 2021-01-18

## 2021-01-18 VITALS
HEART RATE: 65 BPM | WEIGHT: 315 LBS | OXYGEN SATURATION: 97 % | DIASTOLIC BLOOD PRESSURE: 80 MMHG | HEIGHT: 70 IN | BODY MASS INDEX: 45.1 KG/M2 | SYSTOLIC BLOOD PRESSURE: 122 MMHG | TEMPERATURE: 97.7 F

## 2021-01-18 DIAGNOSIS — E03.9 HYPOTHYROIDISM, UNSPECIFIED TYPE: ICD-10-CM

## 2021-01-18 DIAGNOSIS — D58.2 ELEVATED HEMOGLOBIN (HCC): ICD-10-CM

## 2021-01-18 DIAGNOSIS — E29.1 HYPOGONADISM MALE: ICD-10-CM

## 2021-01-18 DIAGNOSIS — N18.30 CHRONIC RENAL INSUFFICIENCY, STAGE 3 (MODERATE) (HCC): ICD-10-CM

## 2021-01-18 DIAGNOSIS — I10 ESSENTIAL HYPERTENSION: Primary | ICD-10-CM

## 2021-01-18 DIAGNOSIS — F51.01 PRIMARY INSOMNIA: ICD-10-CM

## 2021-01-18 PROCEDURE — 99214 OFFICE O/P EST MOD 30 MIN: CPT | Performed by: INTERNAL MEDICINE

## 2021-01-18 RX ORDER — AMLODIPINE BESYLATE 10 MG/1
10 TABLET ORAL DAILY
Qty: 90 TABLET | Refills: 1 | Status: SHIPPED | OUTPATIENT
Start: 2021-01-18 | End: 2021-07-28 | Stop reason: SDUPTHER

## 2021-01-18 RX ORDER — LEVOTHYROXINE SODIUM 0.1 MG/1
100 TABLET ORAL DAILY
Qty: 90 TABLET | Refills: 1 | Status: SHIPPED | OUTPATIENT
Start: 2021-01-18 | End: 2021-07-01 | Stop reason: SDUPTHER

## 2021-01-18 RX ORDER — AMITRIPTYLINE HYDROCHLORIDE 50 MG/1
50 TABLET, FILM COATED ORAL NIGHTLY
Qty: 30 TABLET | Refills: 2 | Status: SHIPPED | OUTPATIENT
Start: 2021-01-18 | End: 2021-05-03

## 2021-01-18 NOTE — PROGRESS NOTES
Subjective   Ryley Heller is a 55 y.o. male.     Chief Complaint   Patient presents with   • Hypertension   • Hypothyroidism       History of Present Illness   Answers for HPI/ROS submitted by the patient on 1/12/2021   What is the primary reason for your visit?: Other  Please describe your symptoms.: Probably here because of needing blood work done for prescription refill  Have you had these symptoms before?: Yes  How long have you been having these symptoms?: 1-4 days  Please list any medications you are currently taking for this condition.: Synthroid and testosterone  Please describe any probable cause for these symptoms. : periodically need to be checked    Follow-up for her hypertension.  Currently, they have been feeling well and asymptomatic without any headaches, cough, chest pain, shortness of breath, swelling, focal neurologic deficit , memory loss or syncope.  Has been taking the medications regularly and adherent with the regimen of amlodipine 10 mg, losartan 100 mg, metoprolol succinate  mg and tramterene-HCTC 37.5-25 daily.  Denies medication side effects and no significant interval events.      Follow-up for thyroid.  Denies fatigue, weakness, constipation/diarrhea, hair/skin changes, weight gain/loss, depression/anxiety, rashes, palpitations, swelling, chest pain, shortness of breath or other issues.  Has been compliant with taking the medication of levothyroxine 100 mcg daily with no recent changes.  Denies any difficulty with the current medication of levothyroxine 100 mcg daily.  Is due for labs.    Follow-up for the testosterone.  Doing 2 ml every 3 weeks.  Last injection about 1.5 weeks ago.  No difficulty with mood, anger, urinating or with BM.  Last PSA 4/20/2020.    Continues to have issues falling asleep and staying asleep.  Has tried melatonin with no relief.  Currently, using doxepin 10 mg with no changes or even drowsiness.  Still not sleeping well and feels draggy.    The  following portions of the patient's history were reviewed and updated as appropriate: allergies, current medications, past family history, past medical history, past social history, past surgical history and problem list.    Depression Screen:  PHQ-2/PHQ-9 Depression Screening 9/11/2020   Little interest or pleasure in doing things 0   Feeling down, depressed, or hopeless 0   Total Score 0       Past Medical History:   Diagnosis Date   • Allergic rhinitis    • Anemia    • BMI 50.0-59.9, adult (CMS/[facility])    • Elevated alkaline phosphatase level    • Encounter for immunization    • Fatigue    • Fever    • Gastritis    • GERD (gastroesophageal reflux disease)    • Hypertension    • Hypogonadism male    • Hypothyroidism    • Morbid obesity (CMS/[facility])    • Pneumonia    • Visit for annual health examination        Past Surgical History:   Procedure Laterality Date   • BACK SURGERY  08/2009   • COLONOSCOPY  10/2014    diverticulosis, internal hemorrhoids and polyps repeat in 3 years   • LAMINECTOMY  2011    Decompress, Facetectomy, Foraminotomy Lumbar Seg • L4-S1    • SHOULDER SURGERY  10/2012       Family History   Adopted: Yes       Social History     Socioeconomic History   • Marital status: Single     Spouse name: Not on file   • Number of children: Not on file   • Years of education: Not on file   • Highest education level: Not on file   Tobacco Use   • Smoking status: Never Smoker   • Smokeless tobacco: Never Used   Substance and Sexual Activity   • Alcohol use: No     Frequency: Never   • Drug use: No       Current Outpatient Medications   Medication Sig Dispense Refill   • amLODIPine (NORVASC) 10 MG tablet Take 1 tablet by mouth Daily. 90 tablet 1   • fluticasone (FLONASE) 50 MCG/ACT nasal spray SPRAY TWO SPRAYS IN EACH NOSTRIL ONCE DAILY 16 g 6   • levothyroxine (SYNTHROID, LEVOTHROID) 100 MCG tablet Take 1 tablet by mouth Daily. 90 tablet 1   • losartan (COZAAR) 100 MG tablet TAKE ONE TABLET BY MOUTH DAILY 90  "tablet 0   • metoprolol succinate XL (TOPROL-XL) 200 MG 24 hr tablet TAKE ONE TABLET BY MOUTH DAILY 30 tablet 4   • Testosterone Cypionate (DEPOTESTOTERONE CYPIONATE) 200 MG/ML injection Inject 1 mL into the appropriate muscle as directed by prescriber Every 21 (Twenty-One) Days. 2 mL 2   • triamterene-hydrochlorothiazide (MAXZIDE-25) 37.5-25 MG per tablet TAKE ONE TABLET BY MOUTH DAILY 90 tablet 0   • amitriptyline (ELAVIL) 50 MG tablet Take 1 tablet by mouth Every Night. 30 tablet 2     No current facility-administered medications for this visit.        Review of Systems   Constitutional: Negative for activity change, appetite change, fatigue, fever, unexpected weight gain and unexpected weight loss.   HENT: Negative for nosebleeds, rhinorrhea, trouble swallowing and voice change.    Eyes: Negative for visual disturbance.   Respiratory: Negative for cough, chest tightness, shortness of breath and wheezing.    Cardiovascular: Negative for chest pain, palpitations and leg swelling.   Gastrointestinal: Negative for abdominal pain, blood in stool, constipation, diarrhea, nausea, vomiting, GERD and indigestion.   Genitourinary: Negative for dysuria, frequency and hematuria.   Musculoskeletal: Negative for arthralgias, back pain and myalgias.   Skin: Negative for rash and bruise.   Neurological: Negative for dizziness, tremors, weakness, light-headedness, numbness, headache and memory problem.   Hematological: Negative for adenopathy. Does not bruise/bleed easily.   Psychiatric/Behavioral: Negative for sleep disturbance and depressed mood. The patient is not nervous/anxious.        Objective   /80 (BP Location: Left arm, Patient Position: Sitting, Cuff Size: Large Adult)   Pulse 65   Temp 97.7 °F (36.5 °C) (Temporal)   Ht 177.8 cm (70\")   Wt (!) 145 kg (320 lb)   SpO2 97%   BMI 45.92 kg/m²     Physical Exam  Vitals signs and nursing note reviewed.   Constitutional:       General: He is not in acute " distress.     Appearance: He is well-developed. He is not diaphoretic.   HENT:      Head: Normocephalic and atraumatic.      Right Ear: External ear normal.      Left Ear: External ear normal.      Nose: Nose normal.   Eyes:      Conjunctiva/sclera: Conjunctivae normal.      Pupils: Pupils are equal, round, and reactive to light.   Neck:      Musculoskeletal: Normal range of motion and neck supple.      Thyroid: No thyromegaly.      Trachea: No tracheal deviation.   Cardiovascular:      Rate and Rhythm: Normal rate and regular rhythm.      Heart sounds: Normal heart sounds. No murmur. No friction rub. No gallop.    Pulmonary:      Effort: Pulmonary effort is normal. No respiratory distress.      Breath sounds: Normal breath sounds.   Abdominal:      General: Bowel sounds are normal.      Palpations: Abdomen is soft. There is no mass.      Tenderness: There is no abdominal tenderness. There is no guarding.   Musculoskeletal: Normal range of motion.   Lymphadenopathy:      Cervical: No cervical adenopathy.   Skin:     General: Skin is warm and dry.      Capillary Refill: Capillary refill takes less than 2 seconds.      Findings: No rash.   Neurological:      Mental Status: He is alert and oriented to person, place, and time.      Motor: No abnormal muscle tone.      Deep Tendon Reflexes: Reflexes normal.   Psychiatric:         Behavior: Behavior normal.         Thought Content: Thought content normal.         Judgment: Judgment normal.         No results found for this or any previous visit (from the past 2016 hour(s)).  Assessment/Plan   Diagnoses and all orders for this visit:    1. Essential hypertension (Primary)  -     amLODIPine (NORVASC) 10 MG tablet; Take 1 tablet by mouth Daily.  Dispense: 90 tablet; Refill: 1  -     CBC & Differential  -     Comprehensive Metabolic Panel    2. Hypothyroidism, unspecified type  -     levothyroxine (SYNTHROID, LEVOTHROID) 100 MCG tablet; Take 1 tablet by mouth Daily.   Dispense: 90 tablet; Refill: 1  -     TSH  -     T4, Free    3. Hypogonadism male  -     Testosterone (Free & Total), LC / MS  -     Comprehensive Metabolic Panel    4. Primary insomnia  -     amitriptyline (ELAVIL) 50 MG tablet; Take 1 tablet by mouth Every Night.  Dispense: 30 tablet; Refill: 2    5. Chronic renal insufficiency, stage 3 (moderate)  -     Comprehensive Metabolic Panel    6. Elevated hemoglobin (CMS/HCC)  -     CBC & Differential  -     Testosterone (Free & Total), LC / MS    Hypertension is well controlled.  Continue current medication unchanged.  We will check the CMP and CBC today.  History of hypothyroidism previously had been doing well continue current dosing of the levothyroxine and check a thyroid panel.  Hypogonadism check the testosterone level along with a CBC to be sure there is no other side effects secondary to the history of elevated hemoglobin which is likely secondary to his testosterone.  Insomnia not treated well with the doxepin and not responding to doxepin nor melatonin.  We will try amitriptyline starting at 50 mg daily and observe.           · COVID-19 Precautions - Patient was compliant in wearing a mask. When I saw the patient, I used appropriate personal protective equipment (PPE) including mask and eye shield (standard procedure).  Additionally, I used gown and gloves if indicated.  Hand hygiene was completed before and after seeing the patient.  · Dictated utilizing Dragon Dictation

## 2021-01-22 LAB
ALBUMIN SERPL-MCNC: 4.1 G/DL (ref 3.5–5.2)
ALBUMIN/GLOB SERPL: 2 G/DL
ALP SERPL-CCNC: 135 U/L (ref 39–117)
ALT SERPL-CCNC: 38 U/L (ref 1–41)
AST SERPL-CCNC: 24 U/L (ref 1–40)
BASOPHILS # BLD AUTO: 0.04 10*3/MM3 (ref 0–0.2)
BASOPHILS NFR BLD AUTO: 0.6 % (ref 0–1.5)
BILIRUB SERPL-MCNC: 0.6 MG/DL (ref 0–1.2)
BUN SERPL-MCNC: 21 MG/DL (ref 6–20)
BUN/CREAT SERPL: 14 (ref 7–25)
CALCIUM SERPL-MCNC: 9.1 MG/DL (ref 8.6–10.5)
CHLORIDE SERPL-SCNC: 101 MMOL/L (ref 98–107)
CO2 SERPL-SCNC: 28.1 MMOL/L (ref 22–29)
CREAT SERPL-MCNC: 1.5 MG/DL (ref 0.76–1.27)
EOSINOPHIL # BLD AUTO: 0.14 10*3/MM3 (ref 0–0.4)
EOSINOPHIL NFR BLD AUTO: 2.2 % (ref 0.3–6.2)
ERYTHROCYTE [DISTWIDTH] IN BLOOD BY AUTOMATED COUNT: 13.7 % (ref 12.3–15.4)
GLOBULIN SER CALC-MCNC: 2.1 GM/DL
GLUCOSE SERPL-MCNC: 79 MG/DL (ref 65–99)
HCT VFR BLD AUTO: 46.2 % (ref 37.5–51)
HGB BLD-MCNC: 15.5 G/DL (ref 13–17.7)
IMM GRANULOCYTES # BLD AUTO: 0.02 10*3/MM3 (ref 0–0.05)
IMM GRANULOCYTES NFR BLD AUTO: 0.3 % (ref 0–0.5)
LYMPHOCYTES # BLD AUTO: 1.51 10*3/MM3 (ref 0.7–3.1)
LYMPHOCYTES NFR BLD AUTO: 24.1 % (ref 19.6–45.3)
MCH RBC QN AUTO: 30.1 PG (ref 26.6–33)
MCHC RBC AUTO-ENTMCNC: 33.5 G/DL (ref 31.5–35.7)
MCV RBC AUTO: 89.7 FL (ref 79–97)
MONOCYTES # BLD AUTO: 0.54 10*3/MM3 (ref 0.1–0.9)
MONOCYTES NFR BLD AUTO: 8.6 % (ref 5–12)
NEUTROPHILS # BLD AUTO: 4.02 10*3/MM3 (ref 1.7–7)
NEUTROPHILS NFR BLD AUTO: 64.2 % (ref 42.7–76)
NRBC BLD AUTO-RTO: 0 /100 WBC (ref 0–0.2)
PLATELET # BLD AUTO: 144 10*3/MM3 (ref 140–450)
POTASSIUM SERPL-SCNC: 4.4 MMOL/L (ref 3.5–5.2)
PROT SERPL-MCNC: 6.2 G/DL (ref 6–8.5)
RBC # BLD AUTO: 5.15 10*6/MM3 (ref 4.14–5.8)
SODIUM SERPL-SCNC: 137 MMOL/L (ref 136–145)
T4 FREE SERPL-MCNC: 0.93 NG/DL (ref 0.93–1.7)
TESTOST FREE SERPL-MCNC: 11.8 PG/ML (ref 7.2–24)
TESTOST SERPL-MCNC: 481.9 NG/DL (ref 264–916)
TSH SERPL DL<=0.005 MIU/L-ACNC: 3.34 UIU/ML (ref 0.27–4.2)
WBC # BLD AUTO: 6.27 10*3/MM3 (ref 3.4–10.8)

## 2021-02-01 RX ORDER — METOPROLOL SUCCINATE 200 MG/1
TABLET, EXTENDED RELEASE ORAL
Qty: 30 TABLET | Refills: 3 | Status: SHIPPED | OUTPATIENT
Start: 2021-02-01 | End: 2021-07-01 | Stop reason: SDUPTHER

## 2021-02-07 DIAGNOSIS — E29.1 HYPOGONADISM MALE: ICD-10-CM

## 2021-02-08 RX ORDER — TESTOSTERONE CYPIONATE 200 MG/ML
INJECTION, SOLUTION INTRAMUSCULAR
Qty: 2 ML | Refills: 3 | Status: SHIPPED | OUTPATIENT
Start: 2021-02-08 | End: 2021-07-01 | Stop reason: SDUPTHER

## 2021-02-08 NOTE — TELEPHONE ENCOUNTER
lov 1/18/2021  Nov due 7/18/2021  Last refill 9/11/2020    I don't know the qty Dr. Lester normally gives.

## 2021-02-10 DIAGNOSIS — E29.1 HYPOGONADISM MALE: ICD-10-CM

## 2021-02-11 RX ORDER — TESTOSTERONE CYPIONATE 200 MG/ML
INJECTION, SOLUTION INTRAMUSCULAR
Refills: 1 | OUTPATIENT
Start: 2021-02-11

## 2021-03-24 ENCOUNTER — BULK ORDERING (OUTPATIENT)
Dept: CASE MANAGEMENT | Facility: OTHER | Age: 56
End: 2021-03-24

## 2021-03-24 DIAGNOSIS — Z23 IMMUNIZATION DUE: ICD-10-CM

## 2021-05-01 DIAGNOSIS — F51.01 PRIMARY INSOMNIA: ICD-10-CM

## 2021-05-03 RX ORDER — AMITRIPTYLINE HYDROCHLORIDE 50 MG/1
TABLET, FILM COATED ORAL
Qty: 30 TABLET | Refills: 1 | Status: SHIPPED | OUTPATIENT
Start: 2021-05-03 | End: 2021-07-01 | Stop reason: SDUPTHER

## 2021-07-28 ENCOUNTER — OFFICE VISIT (OUTPATIENT)
Dept: FAMILY MEDICINE CLINIC | Facility: CLINIC | Age: 56
End: 2021-07-28

## 2021-07-28 VITALS
DIASTOLIC BLOOD PRESSURE: 68 MMHG | WEIGHT: 315 LBS | HEIGHT: 70 IN | OXYGEN SATURATION: 94 % | TEMPERATURE: 98 F | BODY MASS INDEX: 45.1 KG/M2 | SYSTOLIC BLOOD PRESSURE: 122 MMHG | HEART RATE: 69 BPM

## 2021-07-28 DIAGNOSIS — F51.01 PRIMARY INSOMNIA: Primary | ICD-10-CM

## 2021-07-28 DIAGNOSIS — E03.9 HYPOTHYROIDISM, UNSPECIFIED TYPE: ICD-10-CM

## 2021-07-28 DIAGNOSIS — Z12.5 SPECIAL SCREENING, PROSTATE CANCER: ICD-10-CM

## 2021-07-28 DIAGNOSIS — M54.50 LUMBAR BACK PAIN: ICD-10-CM

## 2021-07-28 DIAGNOSIS — N18.30 CHRONIC RENAL INSUFFICIENCY, STAGE 3 (MODERATE) (HCC): ICD-10-CM

## 2021-07-28 DIAGNOSIS — E29.1 HYPOGONADISM MALE: ICD-10-CM

## 2021-07-28 DIAGNOSIS — I10 ESSENTIAL HYPERTENSION: ICD-10-CM

## 2021-07-28 PROCEDURE — 99214 OFFICE O/P EST MOD 30 MIN: CPT | Performed by: INTERNAL MEDICINE

## 2021-07-28 RX ORDER — SUVOREXANT 10 MG/1
1 TABLET, FILM COATED ORAL
Qty: 30 TABLET | Refills: 3 | Status: SHIPPED | OUTPATIENT
Start: 2021-07-28 | End: 2021-07-29

## 2021-07-28 RX ORDER — AMLODIPINE BESYLATE 10 MG/1
10 TABLET ORAL DAILY
Qty: 90 TABLET | Refills: 1 | Status: SHIPPED | OUTPATIENT
Start: 2021-07-28 | End: 2022-01-26

## 2021-07-28 RX ORDER — IBUPROFEN 800 MG/1
800 TABLET ORAL EVERY 6 HOURS PRN
Qty: 60 TABLET | Refills: 1 | Status: SHIPPED | OUTPATIENT
Start: 2021-07-28 | End: 2022-01-26

## 2021-07-28 RX ORDER — METOPROLOL SUCCINATE 200 MG/1
200 TABLET, EXTENDED RELEASE ORAL DAILY
Qty: 30 TABLET | Refills: 3 | Status: SHIPPED | OUTPATIENT
Start: 2021-07-28 | End: 2022-03-07

## 2021-07-28 RX ORDER — LEVOTHYROXINE SODIUM 0.1 MG/1
100 TABLET ORAL DAILY
Qty: 90 TABLET | Refills: 1 | Status: SHIPPED | OUTPATIENT
Start: 2021-07-28 | End: 2022-08-01 | Stop reason: SDUPTHER

## 2021-07-28 RX ORDER — TESTOSTERONE CYPIONATE 200 MG/ML
200 INJECTION, SOLUTION INTRAMUSCULAR
Qty: 2 ML | Refills: 0 | Status: SHIPPED | OUTPATIENT
Start: 2021-07-28 | End: 2021-09-09

## 2021-07-28 NOTE — PROGRESS NOTES
Subjective   Ryley Heller is a 56 y.o. male.     Chief Complaint   Patient presents with   • Hypertension       History of Present Illness   Answers for HPI/ROS submitted by the patient on 7/21/2021  What is the primary reason for your visit?: Other  Please describe your symptoms.: Medication refills and routine blood work  Have you had these symptoms before?: Yes  How long have you been having these symptoms?: Greater than 2 weeks    Follow-up for her hypertension.  Currently, they have been feeling well and asymptomatic without any headaches, cough, chest pain, shortness of breath, swelling, focal neurologic deficit , memory loss or syncope.  Has been taking the medications regularly and adherent with the regimen of amlodipine 10 mg, losartan 100 mg, metoprolol succinate  mg and tramterene-HCTC 37.5-25 daily.  Denies medication side effects and no significant interval events.       Follow-up for thyroid.  Denies fatigue, weakness, constipation/diarrhea, hair/skin changes, weight gain/loss, depression/anxiety, rashes, palpitations, swelling, chest pain, shortness of breath or other issues.  Has been compliant with taking the medication of levothyroxine 100 mcg daily with no recent changes.  Denies any difficulty with the current medication of levothyroxine 100 mcg daily.  Is due for labs.     Follow-up for the testosterone.  Doing 2 ml every 3 weeks.  Last injection about 1.5 weeks ago.  No difficulty with mood, anger, urinating or with BM.  Last PSA 4/20/2020.    Still having difficulty falling asleep at night 4-5 times a week and about 1 time a week awakens after several hours and cannot go back to sleep.  No stressors or issues at home.  Has tried amitriptyline 50 mg, doxepin 10 mg and melatonin with no relief but no side effects.  Denies anxiety, panic attacks or other issues.     Has been having low back pain with no weakness or numbness.  Taking OTC ibuprofen that helps and no GI upset.    The  following portions of the patient's history were reviewed and updated as appropriate: allergies, current medications, past family history, past medical history, past social history, past surgical history and problem list.    Depression Screen:  PHQ-2/PHQ-9 Depression Screening 9/11/2020   Little interest or pleasure in doing things 0   Feeling down, depressed, or hopeless 0   Total Score 0       Past Medical History:   Diagnosis Date   • Allergic forever    flonase and over the counter   • Allergic rhinitis    • Anemia    • BMI 50.0-59.9, adult (CMS/[facility])    • Elevated alkaline phosphatase level    • Encounter for immunization    • Fatigue    • Fever    • Gastritis    • GERD (gastroesophageal reflux disease)    • Hypertension    • Hypogonadism male    • Hypothyroidism    • Morbid obesity (CMS/[facility])    • Pneumonia    • Visit for annual health examination        Past Surgical History:   Procedure Laterality Date   • BACK SURGERY  08/2009   • BARIATRIC SURGERY  1990s   • COLONOSCOPY  10/2014    diverticulosis, internal hemorrhoids and polyps repeat in 3 years   • LAMINECTOMY  2011    Decompress, Facetectomy, Foraminotomy Lumbar Seg • L4-S1    • SHOULDER SURGERY  10/2012   • SPINE SURGERY  2011?   • TONSILLECTOMY  1970       Family History   Adopted: Yes       Social History     Socioeconomic History   • Marital status: Single     Spouse name: Not on file   • Number of children: Not on file   • Years of education: Not on file   • Highest education level: Not on file   Tobacco Use   • Smoking status: Never Smoker   • Smokeless tobacco: Never Used   • Tobacco comment: never used   Substance and Sexual Activity   • Alcohol use: No   • Drug use: No       Current Outpatient Medications   Medication Sig Dispense Refill   • amLODIPine (NORVASC) 10 MG tablet Take 1 tablet by mouth Daily. 90 tablet 1   • fluticasone (FLONASE) 50 MCG/ACT nasal spray 2 sprays by Each Nare route Daily. 16 g 6   • levothyroxine (SYNTHROID, LEVOTHROID)  100 MCG tablet Take 1 tablet by mouth Daily. 90 tablet 1   • losartan (COZAAR) 100 MG tablet Take 1 tablet by mouth Daily. 90 tablet 0   • metoprolol succinate XL (TOPROL-XL) 200 MG 24 hr tablet Take 1 tablet by mouth Daily. 30 tablet 3   • Testosterone Cypionate (DEPOTESTOTERONE CYPIONATE) 200 MG/ML injection Inject 1 mL into the appropriate muscle as directed by prescriber Every 21 (Twenty-One) Days. 2 mL 0   • triamterene-hydrochlorothiazide (MAXZIDE-25) 37.5-25 MG per tablet Take 1 tablet by mouth Daily. 90 tablet 0   • ibuprofen (ADVIL,MOTRIN) 800 MG tablet Take 1 tablet by mouth Every 6 (Six) Hours As Needed for Mild Pain . 60 tablet 1   • Suvorexant (Belsomra) 10 MG tablet Take 1 tablet by mouth every night at bedtime for 1 dose. 30 tablet 3     No current facility-administered medications for this visit.       Review of Systems   Constitutional: Negative for activity change, appetite change, fatigue, fever, unexpected weight gain and unexpected weight loss.   HENT: Negative for nosebleeds, rhinorrhea, trouble swallowing and voice change.    Eyes: Negative for visual disturbance.   Respiratory: Negative for cough, chest tightness, shortness of breath and wheezing.    Cardiovascular: Negative for chest pain, palpitations and leg swelling.   Gastrointestinal: Negative for abdominal pain, blood in stool, constipation, diarrhea, nausea, vomiting, GERD and indigestion.   Genitourinary: Negative for dysuria, frequency and hematuria.   Musculoskeletal: Negative for arthralgias, back pain and myalgias.   Skin: Negative for rash and bruise.   Neurological: Negative for dizziness, tremors, weakness, light-headedness, numbness, headache and memory problem.   Hematological: Negative for adenopathy. Does not bruise/bleed easily.   Psychiatric/Behavioral: Positive for sleep disturbance. Negative for depressed mood. The patient is not nervous/anxious.        Objective   /68 (BP Location: Left arm, Patient Position:  "Sitting, Cuff Size: Large Adult)   Pulse 69   Temp 98 °F (36.7 °C) (Temporal)   Ht 177.8 cm (70\")   Wt (!) 147 kg (324 lb)   SpO2 94%   BMI 46.49 kg/m²     Physical Exam  Vitals and nursing note reviewed.   Constitutional:       General: He is not in acute distress.     Appearance: He is well-developed. He is obese. He is not diaphoretic.   HENT:      Head: Normocephalic and atraumatic.      Right Ear: External ear normal.      Left Ear: External ear normal.      Nose: Nose normal.   Eyes:      Conjunctiva/sclera: Conjunctivae normal.      Pupils: Pupils are equal, round, and reactive to light.   Neck:      Thyroid: No thyromegaly.      Trachea: No tracheal deviation.   Cardiovascular:      Rate and Rhythm: Normal rate and regular rhythm.      Heart sounds: Normal heart sounds. No murmur heard.   No friction rub. No gallop.    Pulmonary:      Effort: Pulmonary effort is normal. No respiratory distress.      Breath sounds: Normal breath sounds.   Abdominal:      General: Bowel sounds are normal.      Palpations: Abdomen is soft. There is no mass.      Tenderness: There is no abdominal tenderness. There is no guarding.   Musculoskeletal:         General: Normal range of motion.      Cervical back: Normal range of motion and neck supple.   Lymphadenopathy:      Cervical: No cervical adenopathy.   Skin:     General: Skin is warm and dry.      Capillary Refill: Capillary refill takes less than 2 seconds.      Findings: No rash.   Neurological:      Mental Status: He is alert and oriented to person, place, and time.      Motor: No abnormal muscle tone.      Deep Tendon Reflexes: Reflexes normal.   Psychiatric:         Behavior: Behavior normal.         Thought Content: Thought content normal.         Judgment: Judgment normal.         No results found for this or any previous visit (from the past 2016 hour(s)).  Assessment/Plan   Diagnoses and all orders for this visit:    1. Primary insomnia (Primary)  -     " Suvorexant (Belsomra) 10 MG tablet; Take 1 tablet by mouth every night at bedtime for 1 dose.  Dispense: 30 tablet; Refill: 3    2. Hypothyroidism, unspecified type  -     levothyroxine (SYNTHROID, LEVOTHROID) 100 MCG tablet; Take 1 tablet by mouth Daily.  Dispense: 90 tablet; Refill: 1  -     TSH  -     T4, Free    3. Hypogonadism male  -     Testosterone Cypionate (DEPOTESTOTERONE CYPIONATE) 200 MG/ML injection; Inject 1 mL into the appropriate muscle as directed by prescriber Every 21 (Twenty-One) Days.  Dispense: 2 mL; Refill: 0  -     CBC & Differential  -     PSA Screen  -     Testosterone (Free & Total), LC / MS    4. Essential hypertension  -     amLODIPine (NORVASC) 10 MG tablet; Take 1 tablet by mouth Daily.  Dispense: 90 tablet; Refill: 1  -     Comprehensive Metabolic Panel  -     Lipid Panel  -     CBC & Differential    5. Chronic renal insufficiency, stage 3 (moderate) (CMS/HCC)    6. Lumbar back pain  -     ibuprofen (ADVIL,MOTRIN) 800 MG tablet; Take 1 tablet by mouth Every 6 (Six) Hours As Needed for Mild Pain .  Dispense: 60 tablet; Refill: 1    7. Special screening, prostate cancer  -     PSA Screen    Other orders  -     metoprolol succinate XL (TOPROL-XL) 200 MG 24 hr tablet; Take 1 tablet by mouth Daily.  Dispense: 30 tablet; Refill: 3        Persistent insomnia.  Will stop the amitriptyline and will try belsomra for sleep.  We will start with 10 mg at night and given information about coupon that is available online.    Hypertension controlled and no change in the medications at this time.    History of hypogonadism utilizing testosterone.  Continue current medication and recheck level for his CBC testosterone and PSA.    Discussed the lumbar back pain and utilizing the ibuprofen however I did recommend that he utilize this as minimum as possible we will check his kidney function with past history of an acute on chronic renal disease.  We will monitor closely to avoid any renal  dysfunction.    Will check the testosterone, PSA       · COVID-19 Precautions - Patient was compliant in wearing a mask. When I saw the patient, I used appropriate personal protective equipment (PPE) including mask and eye shield (standard procedure).  Additionally, I used gown and gloves if indicated.  Hand hygiene was completed before and after seeing the patient.  · Dictated utilizing Dragon Dictation

## 2021-08-01 LAB
ALBUMIN SERPL-MCNC: 4.3 G/DL (ref 3.5–5.2)
ALBUMIN/GLOB SERPL: 1.7 G/DL
ALP SERPL-CCNC: 177 U/L (ref 39–117)
ALT SERPL-CCNC: 45 U/L (ref 1–41)
AST SERPL-CCNC: 28 U/L (ref 1–40)
BASOPHILS # BLD AUTO: 0.04 10*3/MM3 (ref 0–0.2)
BASOPHILS NFR BLD AUTO: 0.7 % (ref 0–1.5)
BILIRUB SERPL-MCNC: 0.8 MG/DL (ref 0–1.2)
BUN SERPL-MCNC: 24 MG/DL (ref 6–20)
BUN/CREAT SERPL: 17.9 (ref 7–25)
CALCIUM SERPL-MCNC: 9.4 MG/DL (ref 8.6–10.5)
CHLORIDE SERPL-SCNC: 101 MMOL/L (ref 98–107)
CHOLEST SERPL-MCNC: 174 MG/DL (ref 0–200)
CO2 SERPL-SCNC: 28 MMOL/L (ref 22–29)
CREAT SERPL-MCNC: 1.34 MG/DL (ref 0.76–1.27)
EOSINOPHIL # BLD AUTO: 0.1 10*3/MM3 (ref 0–0.4)
EOSINOPHIL NFR BLD AUTO: 1.8 % (ref 0.3–6.2)
ERYTHROCYTE [DISTWIDTH] IN BLOOD BY AUTOMATED COUNT: 14.7 % (ref 12.3–15.4)
GLOBULIN SER CALC-MCNC: 2.6 GM/DL
GLUCOSE SERPL-MCNC: 106 MG/DL (ref 65–99)
HCT VFR BLD AUTO: 47.8 % (ref 37.5–51)
HDLC SERPL-MCNC: 33 MG/DL (ref 40–60)
HGB BLD-MCNC: 15.8 G/DL (ref 13–17.7)
IMM GRANULOCYTES # BLD AUTO: 0.01 10*3/MM3 (ref 0–0.05)
IMM GRANULOCYTES NFR BLD AUTO: 0.2 % (ref 0–0.5)
LDLC SERPL CALC-MCNC: 116 MG/DL (ref 0–100)
LYMPHOCYTES # BLD AUTO: 1.37 10*3/MM3 (ref 0.7–3.1)
LYMPHOCYTES NFR BLD AUTO: 24.8 % (ref 19.6–45.3)
MCH RBC QN AUTO: 30 PG (ref 26.6–33)
MCHC RBC AUTO-ENTMCNC: 33.1 G/DL (ref 31.5–35.7)
MCV RBC AUTO: 90.7 FL (ref 79–97)
MONOCYTES # BLD AUTO: 0.51 10*3/MM3 (ref 0.1–0.9)
MONOCYTES NFR BLD AUTO: 9.2 % (ref 5–12)
NEUTROPHILS # BLD AUTO: 3.49 10*3/MM3 (ref 1.7–7)
NEUTROPHILS NFR BLD AUTO: 63.3 % (ref 42.7–76)
PLATELET # BLD AUTO: 139 10*3/MM3 (ref 140–450)
POTASSIUM SERPL-SCNC: 4.2 MMOL/L (ref 3.5–5.2)
PROT SERPL-MCNC: 6.9 G/DL (ref 6–8.5)
PSA SERPL-MCNC: 0.26 NG/ML (ref 0–4)
RBC # BLD AUTO: 5.27 10*6/MM3 (ref 4.14–5.8)
SODIUM SERPL-SCNC: 141 MMOL/L (ref 136–145)
T4 FREE SERPL-MCNC: 0.96 NG/DL (ref 0.93–1.7)
TESTOST FREE SERPL-MCNC: 8.3 PG/ML (ref 7.2–24)
TESTOST SERPL-MCNC: 396.1 NG/DL (ref 264–916)
TRIGL SERPL-MCNC: 139 MG/DL (ref 0–150)
TSH SERPL DL<=0.005 MIU/L-ACNC: 3.41 UIU/ML (ref 0.27–4.2)
VLDLC SERPL CALC-MCNC: 25 MG/DL (ref 5–40)
WBC # BLD AUTO: 5.52 10*3/MM3 (ref 3.4–10.8)

## 2021-08-05 DIAGNOSIS — E29.1 HYPOGONADISM MALE: ICD-10-CM

## 2021-08-05 RX ORDER — TESTOSTERONE CYPIONATE 200 MG/ML
INJECTION, SOLUTION INTRAMUSCULAR
OUTPATIENT
Start: 2021-08-05

## 2021-08-10 DIAGNOSIS — F51.01 PRIMARY INSOMNIA: Primary | ICD-10-CM

## 2021-08-10 RX ORDER — ZOLPIDEM TARTRATE 5 MG/1
5 TABLET ORAL NIGHTLY PRN
Qty: 20 TABLET | Refills: 1 | Status: SHIPPED | OUTPATIENT
Start: 2021-08-10 | End: 2021-10-29

## 2021-09-08 DIAGNOSIS — E29.1 HYPOGONADISM MALE: ICD-10-CM

## 2021-09-13 RX ORDER — TESTOSTERONE CYPIONATE 200 MG/ML
INJECTION, SOLUTION INTRAMUSCULAR
Qty: 10 ML | Refills: 1 | Status: SHIPPED | OUTPATIENT
Start: 2021-09-13 | End: 2021-09-20 | Stop reason: SDUPTHER

## 2021-09-20 DIAGNOSIS — E29.1 HYPOGONADISM MALE: ICD-10-CM

## 2021-09-20 RX ORDER — TESTOSTERONE CYPIONATE 200 MG/ML
200 INJECTION, SOLUTION INTRAMUSCULAR
Qty: 10 ML | Refills: 1 | Status: SHIPPED | OUTPATIENT
Start: 2021-09-20 | End: 2022-08-21 | Stop reason: SDUPTHER

## 2021-09-26 DIAGNOSIS — I10 ESSENTIAL HYPERTENSION: ICD-10-CM

## 2021-09-27 RX ORDER — LOSARTAN POTASSIUM 100 MG/1
TABLET ORAL
Qty: 90 TABLET | Refills: 0 | Status: SHIPPED | OUTPATIENT
Start: 2021-09-27 | End: 2021-12-28

## 2021-09-27 RX ORDER — TRIAMTERENE AND HYDROCHLOROTHIAZIDE 37.5; 25 MG/1; MG/1
TABLET ORAL
Qty: 90 TABLET | Refills: 0 | Status: SHIPPED | OUTPATIENT
Start: 2021-09-27 | End: 2021-12-28

## 2021-09-27 NOTE — TELEPHONE ENCOUNTER
Rx Refill Note  Requested Prescriptions     Pending Prescriptions Disp Refills   • triamterene-hydrochlorothiazide (MAXZIDE-25) 37.5-25 MG per tablet [Pharmacy Med Name: TRIAMTERENE-HCTZ 37.5-25 MG TB] 90 tablet 0     Sig: TAKE ONE TABLET BY MOUTH DAILY   • losartan (COZAAR) 100 MG tablet [Pharmacy Med Name: LOSARTAN POTASSIUM 100 MG TAB] 90 tablet 0     Sig: TAKE ONE TABLET BY MOUTH DAILY      Last office visit with prescribing clinician: 7/28/2021      Next office visit with prescribing clinician: 1/12/2022            Michela Anthony MA  09/27/21, 08:01 EDT

## 2021-10-27 DIAGNOSIS — F51.01 PRIMARY INSOMNIA: ICD-10-CM

## 2021-11-01 RX ORDER — ZOLPIDEM TARTRATE 5 MG/1
5 TABLET ORAL NIGHTLY PRN
Qty: 20 TABLET | Refills: 0 | Status: SHIPPED | OUTPATIENT
Start: 2021-11-01 | End: 2022-01-04 | Stop reason: SDUPTHER

## 2021-11-28 DIAGNOSIS — F51.01 PRIMARY INSOMNIA: ICD-10-CM

## 2021-11-29 RX ORDER — ZOLPIDEM TARTRATE 5 MG/1
TABLET ORAL
Qty: 20 TABLET | Refills: 0 | OUTPATIENT
Start: 2021-11-29

## 2021-11-29 NOTE — TELEPHONE ENCOUNTER
Rx Refill Note  Requested Prescriptions     Pending Prescriptions Disp Refills   • zolpidem (AMBIEN) 5 MG tablet [Pharmacy Med Name: ZOLPIDEM TARTRATE 5 MG TABLET] 20 tablet 0     Sig: TAKE ONE TABLET BY MOUTH ONCE NIGHTLY AS NEEDED FOR SLEEP      Last office visit with prescribing clinician: 7/28/2021      Next office visit with prescribing clinician: 1/12/2022            Michela Anthony MA  11/29/21, 09:02 EST

## 2021-12-05 DIAGNOSIS — F51.01 PRIMARY INSOMNIA: ICD-10-CM

## 2021-12-06 RX ORDER — ZOLPIDEM TARTRATE 5 MG/1
TABLET ORAL
Qty: 20 TABLET | OUTPATIENT
Start: 2021-12-06

## 2021-12-06 NOTE — TELEPHONE ENCOUNTER
HUB RELAYED MESSAGE. PATIENT WOULD LIKE TO SPEAK TO SOMEONE TO SEE IF HE CAN HAVE ONE MORE REFILL AND COME IN IN January BECAUSE HIS FLEX CARD WILL BE REFILLED IN January. PLEASE ADVISE.

## 2021-12-28 DIAGNOSIS — I10 ESSENTIAL HYPERTENSION: ICD-10-CM

## 2021-12-28 RX ORDER — LOSARTAN POTASSIUM 100 MG/1
TABLET ORAL
Qty: 90 TABLET | Refills: 0 | Status: SHIPPED | OUTPATIENT
Start: 2021-12-28 | End: 2022-04-01

## 2021-12-28 RX ORDER — TRIAMTERENE AND HYDROCHLOROTHIAZIDE 37.5; 25 MG/1; MG/1
TABLET ORAL
Qty: 90 TABLET | Refills: 0 | Status: SHIPPED | OUTPATIENT
Start: 2021-12-28 | End: 2022-04-01

## 2021-12-28 NOTE — TELEPHONE ENCOUNTER
Rx Refill Note  Requested Prescriptions     Pending Prescriptions Disp Refills   • losartan (COZAAR) 100 MG tablet [Pharmacy Med Name: LOSARTAN POTASSIUM 100 MG TAB] 90 tablet 0     Sig: TAKE ONE TABLET BY MOUTH DAILY   • triamterene-hydrochlorothiazide (MAXZIDE-25) 37.5-25 MG per tablet [Pharmacy Med Name: TRIAMTERENE-HCTZ 37.5-25 MG TB] 90 tablet 0     Sig: TAKE ONE TABLET BY MOUTH DAILY      Last office visit with prescribing clinician: 7/28/2021      Next office visit with prescribing clinician: 1/12/2022            Mary Sánchez MA  12/28/21, 09:47 EST

## 2022-01-03 ENCOUNTER — TELEPHONE (OUTPATIENT)
Dept: FAMILY MEDICINE CLINIC | Facility: CLINIC | Age: 57
End: 2022-01-03

## 2022-01-03 NOTE — TELEPHONE ENCOUNTER
Caller: Ryley Heller    Relationship: Self    Best call back number: [phone] (H)    Requested Prescriptions:   zolpidem (AMBIEN) 5 MG tablet     Pharmacy where request should be sent:  [facility] [address] [facility] - [phone]  - [phone]         Additional details provided by patient: PATIENT CALLED TO REQUEST A MEDICATION REFILL ON HIS MEDICATION. PATIENT STATES THAT HE HAS BEEN OUT OF THIS MEDICATION FOR 1 WEEK NOW. PATIENT HAS AN APPOINTMENT SCHEDULED ON 01/25/22.            Does the patient have less than a 3 day supply:  [x] Yes  [] No    Marisol Reid Rep   01/03/22 15:24 EST         THANKS

## 2022-01-03 NOTE — TELEPHONE ENCOUNTER
OKAY FOR HUB TO READ    [facility] - Patient needs to reschedule appointment on 1-12-22. The provider will be out of the office. Please reschedule appointment.

## 2022-01-04 ENCOUNTER — TELEPHONE (OUTPATIENT)
Dept: FAMILY MEDICINE CLINIC | Facility: CLINIC | Age: 57
End: 2022-01-04

## 2022-01-04 ENCOUNTER — TELEMEDICINE (OUTPATIENT)
Dept: FAMILY MEDICINE CLINIC | Facility: CLINIC | Age: 57
End: 2022-01-04

## 2022-01-04 DIAGNOSIS — F51.01 PRIMARY INSOMNIA: ICD-10-CM

## 2022-01-04 PROCEDURE — 99212 OFFICE O/P EST SF 10 MIN: CPT | Performed by: INTERNAL MEDICINE

## 2022-01-04 RX ORDER — ZOLPIDEM TARTRATE 5 MG/1
5 TABLET ORAL NIGHTLY PRN
Qty: 30 TABLET | Refills: 1 | Status: SHIPPED | OUTPATIENT
Start: 2022-01-04 | End: 2022-03-07

## 2022-01-04 NOTE — PROGRESS NOTES
Subjective   Ryley Heller is a 56 y.o. male.     Chief Complaint   Patient presents with   • Insomnia       History of Present Illness   You have chosen to receive care through a telehealth visit.  Do you consent to use a video/audio connection for your medical care today? Yes  Video visit completed utilizing to video conferencing.  Patient located within the home in [facility].  Physician located within the office in [facility].    History of insomnia and using the ambien as needed usually once a week.  Has tried amitriptyline 50 mg, doxepin 10 mg and melatonin with no relief but no side effects.  Belsomra not covered by insurance and to expensive.  Denies anxiety, panic attacks or other issues.     Follow-up for her hypertension.  Currently, they have been feeling well and asymptomatic without any headaches, cough, chest pain, shortness of breath, swelling, focal neurologic deficit , memory loss or syncope.  Has been taking the medications regularly and adherent with the regimen of amlodipine 10 mg, losartan 100 mg, metoprolol succinate  mg and tramterene-HCTC 37.5-25 daily.  Denies medication side effects and no significant interval events.       Follow-up for thyroid.  Denies fatigue, weakness, constipation/diarrhea, hair/skin changes, weight gain/loss, depression/anxiety, rashes, palpitations, swelling, chest pain, shortness of breath or other issues.  Has been compliant with taking the medication of levothyroxine 100 mcg daily with no recent changes.  Denies any difficulty with the current medication of levothyroxine 100 mcg daily.  Is due for labs.     Follow-up for the testosterone.  Doing 2 ml every 3 weeks.  Last injection about 1.5 weeks ago.  No difficulty with mood, anger, urinating or with BM.  Last PSA 4/20/2020.     The following portions of the patient's history were reviewed and updated as appropriate: allergies, current medications, past family history, past medical history, past  social history, past surgical history and problem list.    Depression Screen:  PHQ-2/PHQ-9 Depression Screening 9/11/2020   Little interest or pleasure in doing things 0   Feeling down, depressed, or hopeless 0   Total Score 0       Past Medical History:   Diagnosis Date   • Allergic forever    flonase and over the counter   • Allergic rhinitis    • Anemia    • BMI 50.0-59.9, adult (HCC)    • Elevated alkaline phosphatase level    • Encounter for immunization    • Fatigue    • Fever    • Gastritis    • GERD (gastroesophageal reflux disease)    • Hypertension    • Hypogonadism male    • Hypothyroidism    • Morbid obesity (HCC)    • Pneumonia    • Visit for annual health examination        Past Surgical History:   Procedure Laterality Date   • BACK SURGERY  08/2009   • BARIATRIC SURGERY  1990s   • COLONOSCOPY  10/2014    diverticulosis, internal hemorrhoids and polyps repeat in 3 years   • LAMINECTOMY  2011    Decompress, Facetectomy, Foraminotomy Lumbar Seg • L4-S1    • SHOULDER SURGERY  10/2012   • SPINE SURGERY  2011?   • TONSILLECTOMY  1970       Family History   Adopted: Yes       Social History     Socioeconomic History   • Marital status: Single   Tobacco Use   • Smoking status: Never Smoker   • Smokeless tobacco: Never Used   • Tobacco comment: never used   Substance and Sexual Activity   • Alcohol use: No   • Drug use: No       Current Outpatient Medications   Medication Sig Dispense Refill   • amLODIPine (NORVASC) 10 MG tablet Take 1 tablet by mouth Daily. 90 tablet 1   • fluticasone (FLONASE) 50 MCG/ACT nasal spray 2 sprays by Each Nare route Daily. 16 g 6   • ibuprofen (ADVIL,MOTRIN) 800 MG tablet Take 1 tablet by mouth Every 6 (Six) Hours As Needed for Mild Pain . 60 tablet 1   • levothyroxine (SYNTHROID, LEVOTHROID) 100 MCG tablet Take 1 tablet by mouth Daily. 90 tablet 1   • losartan (COZAAR) 100 MG tablet TAKE ONE TABLET BY MOUTH DAILY 90 tablet 0   • metoprolol succinate XL (TOPROL-XL) 200 MG 24 hr  tablet Take 1 tablet by mouth Daily. 30 tablet 3   • Testosterone Cypionate (DEPOTESTOTERONE CYPIONATE) 200 MG/ML injection Inject 1 mL into the appropriate muscle as directed by prescriber Every 21 (Twenty-One) Days. 10 mL 1   • triamterene-hydrochlorothiazide (MAXZIDE-25) 37.5-25 MG per tablet TAKE ONE TABLET BY MOUTH DAILY 90 tablet 0   • zolpidem (AMBIEN) 5 MG tablet Take 1 tablet by mouth At Night As Needed for Sleep. 30 tablet 1     No current facility-administered medications for this visit.       Review of Systems   Constitutional: Negative for activity change, appetite change, fatigue, fever, unexpected weight gain and unexpected weight loss.   HENT: Negative for nosebleeds, rhinorrhea, trouble swallowing and voice change.    Eyes: Negative for visual disturbance.   Respiratory: Negative for cough, chest tightness, shortness of breath and wheezing.    Cardiovascular: Negative for chest pain, palpitations and leg swelling.   Gastrointestinal: Negative for abdominal pain, blood in stool, constipation, diarrhea, nausea, vomiting, GERD and indigestion.   Genitourinary: Negative for dysuria, frequency and hematuria.   Musculoskeletal: Negative for arthralgias, back pain and myalgias.   Skin: Negative for rash and bruise.   Neurological: Negative for dizziness, tremors, weakness, light-headedness, numbness, headache and memory problem.   Hematological: Negative for adenopathy. Does not bruise/bleed easily.   Psychiatric/Behavioral: Positive for sleep disturbance. Negative for depressed mood. The patient is not nervous/anxious.        Objective   There were no vitals taken for this visit.    Physical Exam   Constitutional: He appears well-developed and well-nourished. No distress.   HENT:   Head: Normocephalic and atraumatic.   Eyes: Pupils are equal, round, and reactive to light. Conjunctivae and EOM are normal.   Pulmonary/Chest: Effort normal.  No respiratory distress.  Neurological: He is alert.   Skin: He is  not diaphoretic.   Psychiatric: He has a normal mood and affect. His behavior is normal. Thought content is normal. He does not express abnormal judgement.       No results found for this or any previous visit (from the past 2016 hour(s)).  Assessment/Plan   Diagnoses and all orders for this visit:    1. Primary insomnia  -     zolpidem (AMBIEN) 5 MG tablet; Take 1 tablet by mouth At Night As Needed for Sleep.  Dispense: 30 tablet; Refill: 1    Insomnia is currently controlled.  Utilizes zolpidem as needed only.  Patient is understanding.  No medications are currently up-to-date no other issues are needed.  Follow-up in 3 months.    SOTO run and reviewed.  Risks of the medication include but are not limited to fatigue, somnolence, increased risk of falls, allergic reaction, dependence, and addiction.    Video visit completed.     I spent 17 minutes caring for Ryley on this date of service. This time includes time spent by me in the following activities:preparing for the visit, obtaining and/or reviewing a separately obtained history, counseling and educating the patient/family/caregiver, ordering medications, tests, or procedures and documenting information in the medical record

## 2022-01-25 DIAGNOSIS — M54.50 LUMBAR BACK PAIN: ICD-10-CM

## 2022-01-25 DIAGNOSIS — I10 ESSENTIAL HYPERTENSION: ICD-10-CM

## 2022-01-26 RX ORDER — AMLODIPINE BESYLATE 10 MG/1
TABLET ORAL
Qty: 90 TABLET | Refills: 1 | Status: SHIPPED | OUTPATIENT
Start: 2022-01-26 | End: 2022-06-14 | Stop reason: SDUPTHER

## 2022-01-26 RX ORDER — IBUPROFEN 800 MG/1
TABLET ORAL
Qty: 60 TABLET | Refills: 1 | Status: SHIPPED | OUTPATIENT
Start: 2022-01-26 | End: 2022-04-04

## 2022-01-26 NOTE — TELEPHONE ENCOUNTER
Rx Refill Note  Requested Prescriptions     Pending Prescriptions Disp Refills   • ibuprofen (ADVIL,MOTRIN) 800 MG tablet [Pharmacy Med Name: IBUPROFEN 800 MG TABLET] 60 tablet 1     Sig: TAKE ONE TABLET BY MOUTH EVERY 6 HOURS AS NEEDED FOR MILD PAIN   • amLODIPine (NORVASC) 10 MG tablet [Pharmacy Med Name: amLODIPine BESYLATE 10 MG TAB] 90 tablet 1     Sig: TAKE ONE TABLET BY MOUTH DAILY      Last office visit with prescribing clinician: 7/28/2021      Next office visit with prescribing clinician: Visit date not found            Michela Anthony MA  01/26/22, 16:45 EST

## 2022-03-06 DIAGNOSIS — F51.01 PRIMARY INSOMNIA: ICD-10-CM

## 2022-03-07 RX ORDER — FLUTICASONE PROPIONATE 50 MCG
SPRAY, SUSPENSION (ML) NASAL
Qty: 16 ML | Refills: 2 | Status: SHIPPED | OUTPATIENT
Start: 2022-03-07 | End: 2022-06-14 | Stop reason: SDUPTHER

## 2022-03-07 RX ORDER — METOPROLOL SUCCINATE 200 MG/1
TABLET, EXTENDED RELEASE ORAL
Qty: 30 TABLET | Refills: 3 | Status: SHIPPED | OUTPATIENT
Start: 2022-03-07 | End: 2022-06-14 | Stop reason: SDUPTHER

## 2022-03-07 RX ORDER — ZOLPIDEM TARTRATE 5 MG/1
TABLET ORAL
Qty: 30 TABLET | Refills: 0 | Status: SHIPPED | OUTPATIENT
Start: 2022-03-07 | End: 2022-04-04

## 2022-03-07 NOTE — TELEPHONE ENCOUNTER
Rx Refill Note  Requested Prescriptions     Pending Prescriptions Disp Refills   • fluticasone (FLONASE) 50 MCG/ACT nasal spray [Pharmacy Med Name: FLUTICASONE PROP 50 MCG SPRAY] 16 mL      Sig: SPRAY TWO SPRAYS IN EACH NOSTRIL ONCE DAILY   • zolpidem (AMBIEN) 5 MG tablet [Pharmacy Med Name: ZOLPIDEM TARTRATE 5 MG TABLET] 30 tablet      Sig: TAKE ONE TABLET BY MOUTH ONCE AT NIGHT AS NEEDED FOR SLEEP   • metoprolol succinate XL (TOPROL-XL) 200 MG 24 hr tablet [Pharmacy Med Name: METOPROLOL SUCC  MG TAB] 30 tablet 3     Sig: TAKE ONE TABLET BY MOUTH DAILY      Last office visit with prescribing clinician:1/4/2022      Next office visit with prescribing clinician: Visit date not found            Michela Anthony MA  03/07/22, 09:05 EST

## 2022-04-01 DIAGNOSIS — I10 ESSENTIAL HYPERTENSION: ICD-10-CM

## 2022-04-01 RX ORDER — LOSARTAN POTASSIUM 100 MG/1
TABLET ORAL
Qty: 90 TABLET | Refills: 0 | Status: SHIPPED | OUTPATIENT
Start: 2022-04-01 | End: 2022-06-14 | Stop reason: SDUPTHER

## 2022-04-01 RX ORDER — TRIAMTERENE AND HYDROCHLOROTHIAZIDE 37.5; 25 MG/1; MG/1
TABLET ORAL
Qty: 90 TABLET | Refills: 0 | Status: SHIPPED | OUTPATIENT
Start: 2022-04-01 | End: 2022-06-14 | Stop reason: SDUPTHER

## 2022-04-01 NOTE — TELEPHONE ENCOUNTER
Rx Refill Note  Requested Prescriptions     Pending Prescriptions Disp Refills   • losartan (COZAAR) 100 MG tablet [Pharmacy Med Name: LOSARTAN POTASSIUM 100 MG TAB] 90 tablet 0     Sig: TAKE ONE TABLET BY MOUTH DAILY   • triamterene-hydrochlorothiazide (MAXZIDE-25) 37.5-25 MG per tablet [Pharmacy Med Name: TRIAMTERENE-HCTZ 37.5-25 MG TB] 90 tablet 0     Sig: TAKE ONE TABLET BY MOUTH DAILY      Last office visit with prescribing clinician: 1/4/22      Next office visit with prescribing clinician: Visit date not found            Michela Anthony MA  04/01/22, 12:00 EDT

## 2022-04-04 DIAGNOSIS — F51.01 PRIMARY INSOMNIA: ICD-10-CM

## 2022-04-04 DIAGNOSIS — M54.50 LUMBAR BACK PAIN: ICD-10-CM

## 2022-04-04 RX ORDER — IBUPROFEN 800 MG/1
TABLET ORAL
Qty: 60 TABLET | Refills: 1 | Status: SHIPPED | OUTPATIENT
Start: 2022-04-04 | End: 2022-09-11 | Stop reason: SDUPTHER

## 2022-04-04 RX ORDER — ZOLPIDEM TARTRATE 5 MG/1
TABLET ORAL
Qty: 30 TABLET | Refills: 0 | Status: SHIPPED | OUTPATIENT
Start: 2022-04-04 | End: 2022-06-06 | Stop reason: SDUPTHER

## 2022-04-04 NOTE — TELEPHONE ENCOUNTER
Rx Refill Note  Requested Prescriptions     Pending Prescriptions Disp Refills   • ibuprofen (ADVIL,MOTRIN) 800 MG tablet [Pharmacy Med Name: IBUPROFEN 800 MG TABLET] 60 tablet 1     Sig: TAKE ONE TABLET BY MOUTH EVERY 6 HOURS AS NEEDED FOR MILD PAIN   • zolpidem (AMBIEN) 5 MG tablet [Pharmacy Med Name: ZOLPIDEM TARTRATE 5 MG TABLET] 30 tablet      Sig: TAKE ONE TABLET BY MOUTH ONCE NIGHTLY AS NEEDED FOR SLEEP      Last office visit with prescribing clinician: 1/4/22     Next office visit with prescribing clinician: Visit date not found            Michela Anthony MA  04/04/22, 13:34 EDT

## 2022-05-03 DIAGNOSIS — F51.01 PRIMARY INSOMNIA: ICD-10-CM

## 2022-05-04 RX ORDER — ZOLPIDEM TARTRATE 5 MG/1
5 TABLET ORAL NIGHTLY PRN
Qty: 30 TABLET | Refills: 0 | OUTPATIENT
Start: 2022-05-04

## 2022-05-04 NOTE — TELEPHONE ENCOUNTER
Rx Refill Note  Requested Prescriptions     Pending Prescriptions Disp Refills   • zolpidem (AMBIEN) 5 MG tablet [Pharmacy Med Name: ZOLPIDEM TARTRATE 5 MG TABLET] 30 tablet      Sig: TAKE ONE TABLET BY MOUTH ONCE NIGHTLY AS NEEDED FOR SLEEP      Last office visit with prescribing clinician: 1/24/22      Next office visit with prescribing clinician:  PAST DUE  Adrienne Jacobson MA  05/04/22, 08:50 EDT

## 2022-05-09 DIAGNOSIS — F51.01 PRIMARY INSOMNIA: ICD-10-CM

## 2022-05-10 RX ORDER — ZOLPIDEM TARTRATE 5 MG/1
TABLET ORAL
Qty: 30 TABLET | OUTPATIENT
Start: 2022-05-10

## 2022-06-06 DIAGNOSIS — F51.01 PRIMARY INSOMNIA: ICD-10-CM

## 2022-06-06 RX ORDER — ZOLPIDEM TARTRATE 5 MG/1
5 TABLET ORAL NIGHTLY PRN
Qty: 30 TABLET | Refills: 0 | Status: SHIPPED | OUTPATIENT
Start: 2022-06-06 | End: 2022-07-05

## 2022-06-06 NOTE — TELEPHONE ENCOUNTER
Caller: Ryley Heller    Relationship: Self    Best call back number: [phone]    Requested Prescriptions:   Requested Prescriptions     Pending Prescriptions Disp Refills   • zolpidem (AMBIEN) 5 MG tablet 30 tablet 0     Sig: Take 1 tablet by mouth At Night As Needed for Sleep.        Pharmacy where request should be sent: [facility] [address] [facility] - [phone]  - [phone]      Additional details provided by patient: PATIENT STATES HE IS OUT OF MEDICATION. HAS AN APPOINTMENT SCHEDULED ON 6/14/22 TO REVIEW, BUT WILL NEED A REFILL TO LAST UNTIL THAT APPOINTMENT.    Does the patient have less than a 3 day supply:  [x] Yes  [] No    Marisol Tracy   06/06/22 12:37 EDT

## 2022-06-14 ENCOUNTER — TELEMEDICINE (OUTPATIENT)
Dept: FAMILY MEDICINE CLINIC | Facility: CLINIC | Age: 57
End: 2022-06-14

## 2022-06-14 VITALS — DIASTOLIC BLOOD PRESSURE: 76 MMHG | SYSTOLIC BLOOD PRESSURE: 95 MMHG

## 2022-06-14 DIAGNOSIS — Z12.5 SPECIAL SCREENING, PROSTATE CANCER: ICD-10-CM

## 2022-06-14 DIAGNOSIS — N18.30 CHRONIC RENAL INSUFFICIENCY, STAGE 3 (MODERATE): ICD-10-CM

## 2022-06-14 DIAGNOSIS — E03.9 HYPOTHYROIDISM, UNSPECIFIED TYPE: Primary | ICD-10-CM

## 2022-06-14 DIAGNOSIS — E29.1 HYPOGONADISM MALE: ICD-10-CM

## 2022-06-14 DIAGNOSIS — J30.2 SEASONAL ALLERGIES: ICD-10-CM

## 2022-06-14 DIAGNOSIS — I10 ESSENTIAL HYPERTENSION: ICD-10-CM

## 2022-06-14 PROCEDURE — 99213 OFFICE O/P EST LOW 20 MIN: CPT | Performed by: INTERNAL MEDICINE

## 2022-06-14 RX ORDER — FLUTICASONE PROPIONATE 50 MCG
2 SPRAY, SUSPENSION (ML) NASAL DAILY
Qty: 48 ML | Refills: 2 | Status: SHIPPED | OUTPATIENT
Start: 2022-06-14

## 2022-06-14 RX ORDER — TRIAMTERENE AND HYDROCHLOROTHIAZIDE 37.5; 25 MG/1; MG/1
1 TABLET ORAL DAILY
Qty: 90 TABLET | Refills: 2 | Status: SHIPPED | OUTPATIENT
Start: 2022-06-14 | End: 2023-03-21

## 2022-06-14 RX ORDER — LOSARTAN POTASSIUM 100 MG/1
100 TABLET ORAL DAILY
Qty: 90 TABLET | Refills: 3 | Status: SHIPPED | OUTPATIENT
Start: 2022-06-14

## 2022-06-14 RX ORDER — METOPROLOL SUCCINATE 200 MG/1
200 TABLET, EXTENDED RELEASE ORAL DAILY
Qty: 90 TABLET | Refills: 3 | Status: SHIPPED | OUTPATIENT
Start: 2022-06-14

## 2022-06-14 RX ORDER — AMLODIPINE BESYLATE 10 MG/1
10 TABLET ORAL DAILY
Qty: 90 TABLET | Refills: 1 | Status: SHIPPED | OUTPATIENT
Start: 2022-06-14 | End: 2022-11-06 | Stop reason: SDUPTHER

## 2022-06-14 NOTE — PROGRESS NOTES
Subjective   Ryley Heller is a 57 y.o. male.     Chief Complaint   Patient presents with   • Insomnia       History of Present Illness   You have chosen to receive care through a telehealth visit.  Do you consent to use a video/audio connection for your medical care today? Yes  Video visit completed utilizing [email or web address] video conferencing.    Patient located within the home in [facility].  Physician located within the office in [facility].     History of insomnia and using the ambien as needed usually 2-3 times per week.  Last filled 6/6/22.  Has tried amitriptyline 50 mg, doxepin 10 mg and melatonin with no relief but no side effects.  Belsomra not covered by insurance and to expensive.  Denies anxiety, panic attacks or other issues.      Follow-up for her hypertension.  Currently, they have been feeling well and asymptomatic without any headaches, cough, chest pain, shortness of breath, swelling, focal neurologic deficit , memory loss or syncope.  Has been taking the medications regularly and adherent with the regimen of amlodipine 10 mg, losartan 100 mg, metoprolol succinate  mg and triamterene-HCTC 37.5-25 daily.  Denies medication side effects and no significant interval events.       Follow-up for thyroid.  Denies fatigue, weakness, constipation/diarrhea, hair/skin changes, weight gain/loss, depression/anxiety, rashes, palpitations, swelling, chest pain, shortness of breath or other issues.  Has been compliant with taking the medication of levothyroxine 100 mcg daily with no recent changes.  Denies any difficulty with the current medication of levothyroxine 100 mcg daily.  Last labs performed on 7/28/2021.     Follow-up for the testosterone.  Doing 2 ml every 3 weeks.  Last injection about 1.5 weeks ago.  No difficulty with mood, anger, urinating or with BM.  Last testosterone level and PSA 7/28/21.       The following portions of the patient's history were reviewed and updated as appropriate:  allergies, current medications, past family history, past medical history, past social history, past surgical history and problem list.    Depression Screen:  PHQ-2/PHQ-9 Depression Screening 9/11/2020   Retired Total Score 0       Past Medical History:   Diagnosis Date   • Allergic forever    flonase and over the counter   • Allergic rhinitis    • Anemia    • BMI 50.0-59.9, adult (HCC)    • Elevated alkaline phosphatase level    • Encounter for immunization    • Fatigue    • Fever    • Gastritis    • GERD (gastroesophageal reflux disease)    • Hypertension    • Hypogonadism male    • Hypothyroidism    • Morbid obesity (HCC)    • Pneumonia    • Visit for annual health examination        Past Surgical History:   Procedure Laterality Date   • BACK SURGERY  08/2009   • BARIATRIC SURGERY  1990s   • COLONOSCOPY  10/2014    diverticulosis, internal hemorrhoids and polyps repeat in 3 years   • LAMINECTOMY  2011    Decompress, Facetectomy, Foraminotomy Lumbar Seg • L4-S1    • SHOULDER SURGERY  10/2012   • SPINE SURGERY  2011?   • TONSILLECTOMY  1970       Family History   Adopted: Yes       Social History     Socioeconomic History   • Marital status: Single   Tobacco Use   • Smoking status: Never Smoker   • Smokeless tobacco: Never Used   • Tobacco comment: never used   Substance and Sexual Activity   • Alcohol use: No   • Drug use: No       Current Outpatient Medications   Medication Sig Dispense Refill   • amLODIPine (NORVASC) 10 MG tablet Take 1 tablet by mouth Daily. 90 tablet 1   • fluticasone (FLONASE) 50 MCG/ACT nasal spray 2 sprays by Each Nare route Daily. 48 mL 2   • ibuprofen (ADVIL,MOTRIN) 800 MG tablet TAKE ONE TABLET BY MOUTH EVERY 6 HOURS AS NEEDED FOR MILD PAIN 60 tablet 1   • levothyroxine (SYNTHROID, LEVOTHROID) 100 MCG tablet Take 1 tablet by mouth Daily. 90 tablet 1   • losartan (COZAAR) 100 MG tablet Take 1 tablet by mouth Daily. 90 tablet 3   • metoprolol succinate XL (TOPROL-XL) 200 MG 24 hr tablet  Take 1 tablet by mouth Daily. 90 tablet 3   • Testosterone Cypionate (DEPOTESTOTERONE CYPIONATE) 200 MG/ML injection Inject 1 mL into the appropriate muscle as directed by prescriber Every 21 (Twenty-One) Days. 10 mL 1   • triamterene-hydrochlorothiazide (MAXZIDE-25) 37.5-25 MG per tablet Take 1 tablet by mouth Daily. 90 tablet 2   • zolpidem (AMBIEN) 5 MG tablet Take 1 tablet by mouth At Night As Needed for Sleep. 30 tablet 0     No current facility-administered medications for this visit.       Review of Systems   Constitutional: Negative for activity change, appetite change, fatigue, fever, unexpected weight gain and unexpected weight loss.   HENT: Negative for nosebleeds, rhinorrhea, trouble swallowing and voice change.    Eyes: Negative for visual disturbance.   Respiratory: Negative for cough, chest tightness, shortness of breath and wheezing.    Cardiovascular: Negative for chest pain, palpitations and leg swelling.   Gastrointestinal: Negative for abdominal pain, blood in stool, constipation, diarrhea, nausea, vomiting, GERD and indigestion.   Genitourinary: Negative for dysuria, frequency and hematuria.   Musculoskeletal: Negative for arthralgias, back pain and myalgias.   Skin: Negative for rash and wound.   Neurological: Negative for dizziness, tremors, weakness, light-headedness, numbness, headache and memory problem.   Hematological: Negative for adenopathy. Does not bruise/bleed easily.   Psychiatric/Behavioral: Negative for sleep disturbance and depressed mood. The patient is not nervous/anxious.        Objective   BP 95/76 (Patient Position: Sitting)     Physical Exam   Constitutional: He appears well-developed and well-nourished. No distress.   HENT:   Head: Normocephalic and atraumatic.   Eyes: Pupils are equal, round, and reactive to light. EOM are normal.   Neck: Neck normal appearance.  Pulmonary/Chest: Effort normal.  No respiratory distress.  Neurological: He is alert.   Skin: He is not  diaphoretic.   Psychiatric: He has a normal mood and affect.       No results found for this or any previous visit (from the past 2016 hour(s)).  Assessment & Plan   Diagnoses and all orders for this visit:    1. Hypothyroidism, unspecified type (Primary)  -     TSH; Future  -     T4, Free; Future    2. Essential hypertension  -     metoprolol succinate XL (TOPROL-XL) 200 MG 24 hr tablet; Take 1 tablet by mouth Daily.  Dispense: 90 tablet; Refill: 3  -     losartan (COZAAR) 100 MG tablet; Take 1 tablet by mouth Daily.  Dispense: 90 tablet; Refill: 3  -     triamterene-hydrochlorothiazide (MAXZIDE-25) 37.5-25 MG per tablet; Take 1 tablet by mouth Daily.  Dispense: 90 tablet; Refill: 2  -     amLODIPine (NORVASC) 10 MG tablet; Take 1 tablet by mouth Daily.  Dispense: 90 tablet; Refill: 1  -     Comprehensive Metabolic Panel; Future  -     Lipid Panel; Future    3. Hypogonadism male  -     Testosterone (Free & Total), LC / MS; Future    4. Chronic renal insufficiency, stage 3 (moderate) (HCC)  -     Comprehensive Metabolic Panel; Future    5. Seasonal allergies  -     fluticasone (FLONASE) 50 MCG/ACT nasal spray; 2 sprays by Each Nare route Daily.  Dispense: 48 mL; Refill: 2    6. Special screening, prostate cancer  -     PSA Screen; Future    Patient sleep is currently doing well continue with current medication.  Will be due for labs after 7/28/2022.  These have been ordered.  Continue current medications unchanged.  Blood pressure is well controlled if not low but he is asymptomatic and therefore we will continue the current dosing unchanged.      Video visit completed.    I spent 25 minutes caring for Ryley on this date of service. This time includes time spent by me in the following activities:preparing for the visit, reviewing tests, obtaining and/or reviewing a separately obtained history, counseling and educating the patient/family/caregiver, ordering medications, tests, or procedures and documenting  information in the medical record

## 2022-07-05 DIAGNOSIS — F51.01 PRIMARY INSOMNIA: ICD-10-CM

## 2022-07-05 RX ORDER — ZOLPIDEM TARTRATE 5 MG/1
TABLET ORAL
Qty: 30 TABLET | Refills: 1 | Status: SHIPPED | OUTPATIENT
Start: 2022-07-05 | End: 2022-09-11 | Stop reason: SDUPTHER

## 2022-07-05 NOTE — TELEPHONE ENCOUNTER
Rx Refill Note  Requested Prescriptions     Pending Prescriptions Disp Refills   • zolpidem (AMBIEN) 5 MG tablet [Pharmacy Med Name: ZOLPIDEM TARTRATE 5 MG TABLET] 30 tablet      Sig: TAKE ONE TABLET BY MOUTH ONCE NIGHTLY AS NEEDED FOR SLEEP      Last office visit with prescribing clinician:  6/14/22  Next office visit with prescribing clinician: due before 9/14/22    Adrienne Jacobson MA  07/05/22, 16:09 EDT      Return in about 6 months (around 12/14/2022) for Next scheduled follow up

## 2022-08-01 DIAGNOSIS — E03.9 HYPOTHYROIDISM, UNSPECIFIED TYPE: ICD-10-CM

## 2022-08-01 DIAGNOSIS — M54.50 LUMBAR BACK PAIN: ICD-10-CM

## 2022-08-01 RX ORDER — IBUPROFEN 800 MG/1
TABLET ORAL
Qty: 60 TABLET | Refills: 1 | OUTPATIENT
Start: 2022-08-01

## 2022-08-01 RX ORDER — LEVOTHYROXINE SODIUM 0.1 MG/1
100 TABLET ORAL DAILY
Qty: 30 TABLET | Refills: 1 | Status: SHIPPED | OUTPATIENT
Start: 2022-08-01 | End: 2022-10-10

## 2022-08-01 RX ORDER — LEVOTHYROXINE SODIUM 0.1 MG/1
TABLET ORAL
Qty: 90 TABLET | Refills: 1 | OUTPATIENT
Start: 2022-08-01

## 2022-08-01 NOTE — TELEPHONE ENCOUNTER
Rx Refill Note  Requested Prescriptions     Pending Prescriptions Disp Refills   • levothyroxine (SYNTHROID, LEVOTHROID) 100 MCG tablet [Pharmacy Med Name: LEVOTHYROXINE 100 MCG TABLET] 90 tablet 1     Sig: TAKE ONE TABLET BY MOUTH DAILY   • ibuprofen (ADVIL,MOTRIN) 800 MG tablet [Pharmacy Med Name: IBUPROFEN 800 MG TABLET] 60 tablet 1     Sig: TAKE ONE TABLET BY MOUTH EVERY 6 HOURS AS NEEDED FOR MILD PAIN      Last office visit with prescribing clinician: 7/28/2021      Next office visit with prescribing clinician: Visit date not founD  LAST REFILL   LEVOTHYROXINE  07/28/2021  ADVIL 04/04/2022

## 2022-08-03 ENCOUNTER — TELEPHONE (OUTPATIENT)
Dept: FAMILY MEDICINE CLINIC | Facility: CLINIC | Age: 57
End: 2022-08-03

## 2022-08-03 NOTE — TELEPHONE ENCOUNTER
OK FOR [facility]    LMB    Sugar slightly elevated, BUN and creatinine are elevated (kidney function), I would like to refer him to a nephrologist, stop his ibuprofen and do not take any anti-inflammatory medication, ALT is elevated, very low testosterone, he needs to follow-up with Dr. Lester to discuss therapy

## 2022-08-03 NOTE — TELEPHONE ENCOUNTER
PATIENT NOTIFIED AND VERBALIZED UNDERSTANDING. PATIENT STATES HE WILL CALL BACK TO SCHEDULE AN APPOINTMENT WHEN HE HAS HIS CALENDAR IN FRONT OF HIM

## 2022-08-21 DIAGNOSIS — E29.1 HYPOGONADISM MALE: ICD-10-CM

## 2022-08-22 RX ORDER — TESTOSTERONE CYPIONATE 200 MG/ML
200 INJECTION, SOLUTION INTRAMUSCULAR
Qty: 10 ML | Refills: 1 | Status: SHIPPED | OUTPATIENT
Start: 2022-08-22 | End: 2022-11-06 | Stop reason: SDUPTHER

## 2022-08-24 DIAGNOSIS — E29.1 HYPOGONADISM MALE: Primary | ICD-10-CM

## 2022-08-24 RX ORDER — SYRINGE W-NEEDLE,DISPOSAB,3 ML 25GX5/8"
SYRINGE, EMPTY DISPOSABLE MISCELLANEOUS
Qty: 50 EACH | Refills: 0 | Status: SHIPPED | OUTPATIENT
Start: 2022-08-24

## 2022-09-11 DIAGNOSIS — F51.01 PRIMARY INSOMNIA: ICD-10-CM

## 2022-09-11 DIAGNOSIS — M54.50 LUMBAR BACK PAIN: ICD-10-CM

## 2022-09-12 RX ORDER — ZOLPIDEM TARTRATE 5 MG/1
5 TABLET ORAL NIGHTLY PRN
Qty: 30 TABLET | Refills: 0 | Status: SHIPPED | OUTPATIENT
Start: 2022-09-12 | End: 2022-10-25 | Stop reason: SDUPTHER

## 2022-09-12 RX ORDER — IBUPROFEN 800 MG/1
800 TABLET ORAL EVERY 6 HOURS PRN
Qty: 60 TABLET | Refills: 1 | Status: SHIPPED | OUTPATIENT
Start: 2022-09-12 | End: 2022-11-06 | Stop reason: SDUPTHER

## 2022-09-12 NOTE — TELEPHONE ENCOUNTER
Rx Refill Note  Requested Prescriptions     Pending Prescriptions Disp Refills   • zolpidem (AMBIEN) 5 MG tablet 30 tablet 1     Sig: Take 1 tablet by mouth At Night As Needed for Sleep.      Last office visit with prescribing clinician: 6/14/22  Next office visit with prescribing clinician: Visit date not found            Michela Anthony MA  09/12/22, 11:14 EDT

## 2022-09-12 NOTE — TELEPHONE ENCOUNTER
Rx Refill Note  Requested Prescriptions     Pending Prescriptions Disp Refills   • ibuprofen (ADVIL,MOTRIN) 800 MG tablet 60 tablet 1     Sig: Take 1 tablet by mouth Every 6 (Six) Hours As Needed for Mild Pain.      Last office visit with prescribing clinician: 6/14/22      Next office visit with prescribing clinician: Visit date not found          {TIP  Is Refill Pharmacy correct?:23}  Michela Anthony MA  09/12/22, 11:18 EDT

## 2022-09-13 NOTE — TELEPHONE ENCOUNTER
One-time refill will need a prescription for any further prescriptions hereafter of this medication.

## 2022-10-09 DIAGNOSIS — F51.01 PRIMARY INSOMNIA: ICD-10-CM

## 2022-10-09 DIAGNOSIS — E03.9 HYPOTHYROIDISM, UNSPECIFIED TYPE: ICD-10-CM

## 2022-10-10 RX ORDER — LEVOTHYROXINE SODIUM 0.1 MG/1
TABLET ORAL
Qty: 30 TABLET | Refills: 1 | Status: SHIPPED | OUTPATIENT
Start: 2022-10-10 | End: 2022-12-14 | Stop reason: SDUPTHER

## 2022-10-10 RX ORDER — ZOLPIDEM TARTRATE 5 MG/1
TABLET ORAL
Qty: 30 TABLET | OUTPATIENT
Start: 2022-10-10

## 2022-10-10 NOTE — TELEPHONE ENCOUNTER
Rx Refill Note  Requested Prescriptions     Pending Prescriptions Disp Refills   • levothyroxine (SYNTHROID, LEVOTHROID) 100 MCG tablet [Pharmacy Med Name: LEVOTHYROXINE 100 MCG TABLET] 30 tablet 1     Sig: TAKE ONE TABLET BY MOUTH DAILY      Last office visit with prescribing clinician: Visit date not found      Next office visit with prescribing clinician: Visit date not found            Michela Anthony MA  10/10/22, 10:28 EDT

## 2022-10-25 ENCOUNTER — OFFICE VISIT (OUTPATIENT)
Dept: FAMILY MEDICINE CLINIC | Facility: CLINIC | Age: 57
End: 2022-10-25

## 2022-10-25 VITALS
WEIGHT: 315 LBS | OXYGEN SATURATION: 92 % | HEART RATE: 62 BPM | TEMPERATURE: 98 F | DIASTOLIC BLOOD PRESSURE: 80 MMHG | HEIGHT: 70 IN | BODY MASS INDEX: 45.1 KG/M2 | SYSTOLIC BLOOD PRESSURE: 128 MMHG

## 2022-10-25 DIAGNOSIS — E29.1 HYPOGONADISM MALE: ICD-10-CM

## 2022-10-25 DIAGNOSIS — Z00.00 ENCOUNTER FOR ANNUAL PHYSICAL EXAM: Primary | ICD-10-CM

## 2022-10-25 DIAGNOSIS — I10 PRIMARY HYPERTENSION: ICD-10-CM

## 2022-10-25 DIAGNOSIS — E03.9 HYPOTHYROIDISM, UNSPECIFIED TYPE: ICD-10-CM

## 2022-10-25 DIAGNOSIS — Z79.899 HIGH RISK MEDICATION USE: ICD-10-CM

## 2022-10-25 DIAGNOSIS — F51.01 PRIMARY INSOMNIA: ICD-10-CM

## 2022-10-25 DIAGNOSIS — N18.30 CHRONIC RENAL INSUFFICIENCY, STAGE 3 (MODERATE): ICD-10-CM

## 2022-10-25 DIAGNOSIS — Z23 NEED FOR IMMUNIZATION AGAINST INFLUENZA: ICD-10-CM

## 2022-10-25 DIAGNOSIS — E66.01 OBESITY, MORBID, BMI 50 OR HIGHER: ICD-10-CM

## 2022-10-25 PROCEDURE — 99396 PREV VISIT EST AGE 40-64: CPT | Performed by: INTERNAL MEDICINE

## 2022-10-25 PROCEDURE — 90686 IIV4 VACC NO PRSV 0.5 ML IM: CPT | Performed by: INTERNAL MEDICINE

## 2022-10-25 PROCEDURE — 90471 IMMUNIZATION ADMIN: CPT | Performed by: INTERNAL MEDICINE

## 2022-10-25 RX ORDER — ZOLPIDEM TARTRATE 5 MG/1
5 TABLET ORAL NIGHTLY PRN
Qty: 30 TABLET | Refills: 0 | Status: SHIPPED | OUTPATIENT
Start: 2022-10-25 | End: 2022-11-28

## 2022-10-25 NOTE — PROGRESS NOTES
Chief Complaint   Patient presents with   • Annual Exam   • Immunizations     flu       HPI:  Ryley Heller, -1965, is a 57 y.o. male who presents for an annual physical.    History of insomnia and using the ambien as needed usually 2-3 times per week.  Last filled 22.  Has tried amitriptyline 50 mg, doxepin 10 mg and melatonin with no relief but no side effects.  Belsomra not covered by insurance and to expensive.  Denies anxiety, panic attacks or other issues.      Follow-up for her hypertension.  Currently, they have been feeling well and asymptomatic without any headaches, cough, chest pain, shortness of breath, swelling, focal neurologic deficit , memory loss or syncope.  Has been taking the medications regularly and adherent with the regimen of amlodipine 10 mg, losartan 100 mg, metoprolol succinate  mg and triamterene-HCTC 37.5-25 daily.  Denies medication side effects and no significant interval events.       Follow-up for thyroid.  Denies fatigue, weakness, constipation/diarrhea, hair/skin changes, weight gain/loss, depression/anxiety, rashes, palpitations, swelling, chest pain, shortness of breath or other issues.  Has been compliant with taking the medication of levothyroxine 100 mcg daily with no recent changes.  Denies any difficulty with the current medication of levothyroxine 100 mcg daily.  Last labs performed on 22.     Follow-up for the testosterone.  Doing 2 ml every 3 weeks.  Last injection about 1.5 weeks ago.  No difficulty with mood, anger, urinating or with BM.  Last testosterone level and PSA 2022 that was very low.       Recent Hospitalizations:  No hospitalization(s) within the last year..    Current Medical Providers:  Patient Care Team:  Shashi Lester MD as PCP - General (Internal Medicine)    Compared to one year ago, the patient feels his physical health is the same and his mental health is the same.    Depression Screen:  PHQ-2/PHQ-9 Depression  "Screening 10/25/2022   Retired Total Score -   Little Interest or Pleasure in Doing Things 0-->not at all   Feeling Down, Depressed or Hopeless 0-->not at all   PHQ-9: Brief Depression Severity Measure Score 0       Past Medical/Family/Social History:  The following portions of the patient's history were reviewed and updated as appropriate: allergies, current medications, past family history, past medical history, past social history, past surgical history and problem list.    Allergies   Allergen Reactions   • Morphine Other (See Comments)     Patient felt heat sensation         Current Outpatient Medications:   •  amLODIPine (NORVASC) 10 MG tablet, Take 1 tablet by mouth Daily., Disp: 90 tablet, Rfl: 1  •  fluticasone (FLONASE) 50 MCG/ACT nasal spray, 2 sprays by Each Nare route Daily., Disp: 48 mL, Rfl: 2  •  ibuprofen (ADVIL,MOTRIN) 800 MG tablet, Take 1 tablet by mouth Every 6 (Six) Hours As Needed for Mild Pain., Disp: 60 tablet, Rfl: 1  •  levothyroxine (SYNTHROID, LEVOTHROID) 100 MCG tablet, TAKE ONE TABLET BY MOUTH DAILY, Disp: 30 tablet, Rfl: 1  •  losartan (COZAAR) 100 MG tablet, Take 1 tablet by mouth Daily., Disp: 90 tablet, Rfl: 3  •  metoprolol succinate XL (TOPROL-XL) 200 MG 24 hr tablet, Take 1 tablet by mouth Daily., Disp: 90 tablet, Rfl: 3  •  Syringe 20G X 1-1/2\" 3 ML misc, Use as directed with testosterone injection every 1 to 4 weeks as ordered., Disp: 50 each, Rfl: 0  •  Testosterone Cypionate (DEPOTESTOTERONE CYPIONATE) 200 MG/ML injection, Inject 1 mL into the appropriate muscle as directed by prescriber Every 21 (Twenty-One) Days., Disp: 10 mL, Rfl: 1  •  triamterene-hydrochlorothiazide (MAXZIDE-25) 37.5-25 MG per tablet, Take 1 tablet by mouth Daily., Disp: 90 tablet, Rfl: 2  •  zolpidem (AMBIEN) 5 MG tablet, Take 1 tablet by mouth At Night As Needed for Sleep., Disp: 30 tablet, Rfl: 0    Current medication list contains no high risk medications.  No harmful drug interactions have been " identified.     Family History   Adopted: Yes       Social History     Tobacco Use   • Smoking status: Never   • Smokeless tobacco: Never   • Tobacco comments:     never used   Substance Use Topics   • Alcohol use: Never       Past Surgical History:   Procedure Laterality Date   • BACK SURGERY  08/2009   • BARIATRIC SURGERY  1990s   • COLONOSCOPY  10/2014    diverticulosis, internal hemorrhoids and polyps repeat in 3 years   • LAMINECTOMY  2011    Decompress, Facetectomy, Foraminotomy Lumbar Seg • L4-S1    • SHOULDER SURGERY  10/2012   • SPINE SURGERY  2011?   • TONSILLECTOMY  1970       Patient Active Problem List   Diagnosis   • Anemia   • GERD (gastroesophageal reflux disease)   • Hypogonadism male   • Hypothyroidism   • Hypertension   • Insomnia   • Obesity, morbid, BMI 50 or higher (HCC)   • Chronic renal insufficiency, stage 3 (moderate) (HCC)   • Elevated hemoglobin (HCC)   • Lumbar back pain   • Encounter for annual physical exam       Review of Systems   Constitutional: Negative for activity change, appetite change, fatigue, fever, unexpected weight gain and unexpected weight loss.   HENT: Negative for nosebleeds, rhinorrhea, trouble swallowing and voice change.    Eyes: Negative for visual disturbance.   Respiratory: Negative for cough, chest tightness, shortness of breath and wheezing.    Cardiovascular: Negative for chest pain, palpitations and leg swelling.   Gastrointestinal: Negative for abdominal pain, blood in stool, constipation, diarrhea, nausea, vomiting, GERD and indigestion.   Genitourinary: Negative for dysuria, frequency and hematuria.   Musculoskeletal: Negative for arthralgias, back pain and myalgias.   Skin: Negative for rash and wound.   Neurological: Negative for dizziness, tremors, weakness, light-headedness, numbness, headache and memory problem.   Hematological: Negative for adenopathy. Does not bruise/bleed easily.   Psychiatric/Behavioral: Positive for sleep disturbance. Negative  "for suicidal ideas and depressed mood. The patient is not nervous/anxious.        Objective     Vitals:    10/25/22 1411   BP: 128/80   BP Location: Left arm   Patient Position: Sitting   Pulse: 62   Temp: 98 °F (36.7 °C)   SpO2: 92%   Weight: (!) 164 kg (360 lb 12.8 oz)   Height: 177.8 cm (70\")   PainSc: 0-No pain       Class 3 Severe Obesity (BMI >=40). Obesity-related health conditions include the following: hypertension and GERD. Obesity is unchanged. BMI is is above average; BMI management plan is completed. We discussed low calorie, low carb based diet program, portion control and increasing exercise.      No results found.    Physical Exam  Vitals and nursing note reviewed.   Constitutional:       General: He is not in acute distress.     Appearance: He is well-developed. He is not diaphoretic.   HENT:      Head: Normocephalic and atraumatic.      Right Ear: External ear normal.      Left Ear: External ear normal.      Nose: Nose normal.   Eyes:      Conjunctiva/sclera: Conjunctivae normal.      Pupils: Pupils are equal, round, and reactive to light.   Neck:      Thyroid: No thyromegaly.      Trachea: No tracheal deviation.   Cardiovascular:      Rate and Rhythm: Normal rate and regular rhythm.      Heart sounds: Normal heart sounds. No murmur heard.    No friction rub. No gallop.   Pulmonary:      Effort: Pulmonary effort is normal. No respiratory distress.      Breath sounds: Normal breath sounds.   Abdominal:      General: Bowel sounds are normal.      Palpations: Abdomen is soft. There is no mass.      Tenderness: There is no abdominal tenderness. There is no guarding.   Musculoskeletal:         General: Normal range of motion.      Cervical back: Normal range of motion and neck supple.   Lymphadenopathy:      Cervical: No cervical adenopathy.   Skin:     General: Skin is warm and dry.      Capillary Refill: Capillary refill takes less than 2 seconds.      Findings: No rash.   Neurological:      Mental " Status: He is alert and oriented to person, place, and time.      Motor: No abnormal muscle tone.      Deep Tendon Reflexes: Reflexes normal.   Psychiatric:         Behavior: Behavior normal.         Thought Content: Thought content normal.         Judgment: Judgment normal.         Recent Lab Results:     Lab Results   Component Value Date    TRIG 98 07/29/2022    HDL 42 07/29/2022    VLDL 18 07/29/2022       Assessment & Plan   Age-appropriate Screening Schedule:  Refer to the list below for future screening recommendations based on patient's age, sex and/or medical conditions.      Health Maintenance   Topic Date Due   • TDAP/TD VACCINES (1 - Tdap) Never done   • ZOSTER VACCINE (1 of 2) Never done   • INFLUENZA VACCINE  Completed       Diagnoses and all orders for this visit:    1. Encounter for annual physical exam (Primary)    2. Primary insomnia  -     zolpidem (AMBIEN) 5 MG tablet; Take 1 tablet by mouth At Night As Needed for Sleep.  Dispense: 30 tablet; Refill: 0  -     ToxASSURE Select 13 (MW) - Urine, Clean Catch    3. Hypogonadism male    4. Hypothyroidism, unspecified type    5. Primary hypertension    6. Chronic renal insufficiency, stage 3 (moderate) ([facility])    7. Obesity, morbid, BMI 50 or higher (HCC)    8. Need for immunization against influenza  -     FluLaval/Fluzone >6 mos ([id number])    9. High risk medication use  -     ToxASSURE Select 13 (MW) - Urine, Clean Catch        Annual wellness visit reviewed with patient.  All past history, medications, social history, and problem list were reviewed.  Discussed advanced directives and living will.  Patient has living will: Living will: Patient refused.  Last labs from 7/29/22 to evaluate the blood sugars, kidney, liver, cholesterol for screening and PSA reviewed.  Discussed flu shot recommended to get the influenza vaccine annually in the fall.  Tdap, influenza, COVID booster, and zoster series discussed.  Patient will consider this.  Encouraged  follow-up with the eye doctor on annual basis.  Discussed weight and encouraged exercise as tolerated while following a healthy diet.  Reviewed sexual health and safe sex practices.  Colon cancer screening discussed and current status: colonoscopy last performed on 8/20/2014 with scheduled repeat in 2024.  Discussed prostate screening last done 7/29/2022.  Follow up with current specialists as needed.     SOTO run and reviewed for the zolpidem and testosterone.  Risks of the medication include but are not limited to fatigue, somnolence, increased risk of falls, allergic reaction, dependence, and addiction.    Try increasing the testosterone to 200 mg IM every 2 weeks.    An After Visit Summary with all of these plans were given to the patient.        Follow Up:  No follow-ups on file.

## 2022-11-02 LAB — DRUGS UR: NORMAL

## 2022-11-06 DIAGNOSIS — E29.1 HYPOGONADISM MALE: ICD-10-CM

## 2022-11-06 DIAGNOSIS — I10 ESSENTIAL HYPERTENSION: ICD-10-CM

## 2022-11-06 DIAGNOSIS — M54.50 LUMBAR BACK PAIN: ICD-10-CM

## 2022-11-08 RX ORDER — AMLODIPINE BESYLATE 10 MG/1
10 TABLET ORAL DAILY
Qty: 90 TABLET | Refills: 1 | Status: SHIPPED | OUTPATIENT
Start: 2022-11-08 | End: 2023-03-21 | Stop reason: SDUPTHER

## 2022-11-08 RX ORDER — TESTOSTERONE CYPIONATE 200 MG/ML
200 INJECTION, SOLUTION INTRAMUSCULAR
Qty: 10 ML | Refills: 1 | Status: SHIPPED | OUTPATIENT
Start: 2022-11-08 | End: 2023-03-21 | Stop reason: SDUPTHER

## 2022-11-08 RX ORDER — IBUPROFEN 800 MG/1
800 TABLET ORAL EVERY 6 HOURS PRN
Qty: 60 TABLET | Refills: 1 | Status: SHIPPED | OUTPATIENT
Start: 2022-11-08 | End: 2023-03-21 | Stop reason: SDUPTHER

## 2022-11-08 NOTE — TELEPHONE ENCOUNTER
Rx Refill Note  Requested Prescriptions     Pending Prescriptions Disp Refills   • amLODIPine (NORVASC) 10 MG tablet 90 tablet 1     Sig: Take 1 tablet by mouth Daily.   • Testosterone Cypionate (DEPOTESTOTERONE CYPIONATE) 200 MG/ML injection 10 mL 1     Sig: Inject 1 mL into the appropriate muscle as directed by prescriber Every 21 (Twenty-One) Days.   • ibuprofen (ADVIL,MOTRIN) 800 MG tablet 60 tablet 1     Sig: Take 1 tablet by mouth Every 6 (Six) Hours As Needed for Mild Pain.      Last office visit with prescribing clinician: 10/25/2022      Next office visit with prescribing clinician: Visit date not found            Michela Anthony MA  11/08/22, 09:31 EST

## 2022-11-27 DIAGNOSIS — F51.01 PRIMARY INSOMNIA: ICD-10-CM

## 2022-11-28 RX ORDER — ZOLPIDEM TARTRATE 5 MG/1
TABLET ORAL
Qty: 30 TABLET | Refills: 0 | Status: SHIPPED | OUTPATIENT
Start: 2022-11-28 | End: 2022-12-29 | Stop reason: SDUPTHER

## 2022-12-14 DIAGNOSIS — E03.9 HYPOTHYROIDISM, UNSPECIFIED TYPE: ICD-10-CM

## 2022-12-14 RX ORDER — LEVOTHYROXINE SODIUM 0.1 MG/1
100 TABLET ORAL DAILY
Qty: 30 TABLET | Refills: 1 | Status: SHIPPED | OUTPATIENT
Start: 2022-12-14 | End: 2023-02-13

## 2022-12-14 NOTE — TELEPHONE ENCOUNTER
Rx Refill Note  Requested Prescriptions     Pending Prescriptions Disp Refills   • levothyroxine (SYNTHROID, LEVOTHROID) 100 MCG tablet 30 tablet 1     Sig: Take 1 tablet by mouth Daily.      Last office visit with prescribing clinician: 10/25/2022   Last telemedicine visit with prescribing clinician: Visit date not found   Next office visit with prescribing clinician: Visit date not found                         Would you like a call back once the refill request has been completed: [] Yes [] No    If the office needs to give you a call back, can they leave a voicemail: [] Yes [] No    Michela Anthony MA  12/14/22, 17:32 EST

## 2022-12-29 DIAGNOSIS — F51.01 PRIMARY INSOMNIA: ICD-10-CM

## 2022-12-29 NOTE — TELEPHONE ENCOUNTER
Rx Refill Note  Requested Prescriptions     Pending Prescriptions Disp Refills   • zolpidem (AMBIEN) 5 MG tablet 30 tablet 0     Sig: Take 1 tablet by mouth At Night As Needed for Sleep.      Last office visit with prescribing clinician: 10/25/2022   Last telemedicine visit with prescribing clinician: Visit date not found   Next office visit with prescribing clinician: Visit date not found                         Would you like a call back once the refill request has been completed: [] Yes [] No    If the office needs to give you a call back, can they leave a voicemail: [] Yes [] No    Stalin Maddox, FREDI/LMR  12/29/22, 14:53 EST

## 2023-01-03 RX ORDER — ZOLPIDEM TARTRATE 5 MG/1
5 TABLET ORAL NIGHTLY PRN
Qty: 30 TABLET | Refills: 0 | Status: SHIPPED | OUTPATIENT
Start: 2023-01-03 | End: 2023-01-31

## 2023-01-31 DIAGNOSIS — F51.01 PRIMARY INSOMNIA: ICD-10-CM

## 2023-01-31 RX ORDER — ZOLPIDEM TARTRATE 5 MG/1
TABLET ORAL
Qty: 30 TABLET | Refills: 0 | Status: SHIPPED | OUTPATIENT
Start: 2023-01-31 | End: 2023-03-21 | Stop reason: SDUPTHER

## 2023-02-12 DIAGNOSIS — E03.9 HYPOTHYROIDISM, UNSPECIFIED TYPE: ICD-10-CM

## 2023-02-13 RX ORDER — LEVOTHYROXINE SODIUM 0.1 MG/1
TABLET ORAL
Qty: 30 TABLET | Refills: 1 | Status: SHIPPED | OUTPATIENT
Start: 2023-02-13

## 2023-03-01 DIAGNOSIS — F51.01 PRIMARY INSOMNIA: ICD-10-CM

## 2023-03-02 RX ORDER — ZOLPIDEM TARTRATE 5 MG/1
TABLET ORAL
Qty: 30 TABLET | Refills: 0 | OUTPATIENT
Start: 2023-03-02

## 2023-03-02 NOTE — TELEPHONE ENCOUNTER
LOV             10/25/2022  AWV  NOV            Visit date not found   Last RF        1/31/23    Adrienne Jacobson, NRCMA/LMR     Rx Controlled Substance Protocol Failed 03/01/2023 08:54 PM    Recent Appt with me in Past 3 Months    Medication not refilled in past 28 days     MUST be seen every 3 months for this medication    Left message to return call. Needs appt for insomnia    HUB OR  MAY RELAY MESSAGE TO PATIENT AND RESPOND.    PLEASE REPLY THAT PT HAS BEEN NOTIFIED.

## 2023-03-21 ENCOUNTER — OFFICE VISIT (OUTPATIENT)
Dept: FAMILY MEDICINE CLINIC | Facility: CLINIC | Age: 58
End: 2023-03-21
Payer: COMMERCIAL

## 2023-03-21 VITALS
DIASTOLIC BLOOD PRESSURE: 78 MMHG | SYSTOLIC BLOOD PRESSURE: 130 MMHG | HEIGHT: 70 IN | TEMPERATURE: 98 F | HEART RATE: 60 BPM | WEIGHT: 315 LBS | RESPIRATION RATE: 16 BRPM | BODY MASS INDEX: 45.1 KG/M2 | OXYGEN SATURATION: 98 %

## 2023-03-21 DIAGNOSIS — M54.50 LUMBAR BACK PAIN: ICD-10-CM

## 2023-03-21 DIAGNOSIS — F51.01 PRIMARY INSOMNIA: ICD-10-CM

## 2023-03-21 DIAGNOSIS — E29.1 HYPOGONADISM MALE: ICD-10-CM

## 2023-03-21 DIAGNOSIS — I10 ESSENTIAL HYPERTENSION: ICD-10-CM

## 2023-03-21 PROCEDURE — 99214 OFFICE O/P EST MOD 30 MIN: CPT | Performed by: INTERNAL MEDICINE

## 2023-03-21 RX ORDER — TESTOSTERONE CYPIONATE 200 MG/ML
200 INJECTION, SOLUTION INTRAMUSCULAR
Qty: 10 ML | Refills: 1 | Status: SHIPPED | OUTPATIENT
Start: 2023-03-21

## 2023-03-21 RX ORDER — IBUPROFEN 800 MG/1
800 TABLET ORAL EVERY 6 HOURS PRN
Qty: 60 TABLET | Refills: 1 | Status: SHIPPED | OUTPATIENT
Start: 2023-03-21

## 2023-03-21 RX ORDER — AMLODIPINE BESYLATE 10 MG/1
10 TABLET ORAL DAILY
Qty: 90 TABLET | Refills: 1 | Status: SHIPPED | OUTPATIENT
Start: 2023-03-21

## 2023-03-21 RX ORDER — ZOLPIDEM TARTRATE 5 MG/1
5 TABLET ORAL NIGHTLY PRN
Qty: 30 TABLET | Refills: 2 | Status: SHIPPED | OUTPATIENT
Start: 2023-03-21

## 2023-03-21 NOTE — PROGRESS NOTES
Subjective   Ryley Heller is a 57 y.o. male.     Chief Complaint   Patient presents with   • Hypertension   • Med Refill       Hypertension  Pertinent negatives include no chest pain, palpitations or shortness of breath.      Answers for HPI/ROS submitted by the patient on 3/18/2023  What is the primary reason for your visit?: Other  Please describe your symptoms.: No symptoms, you are forcing me in to get prescription refills  Have you had these symptoms before?: Yes  How long have you been having these symptoms?: Greater than 2 weeks  Please list any medications you are currently taking for this condition.: All of them  Please describe any probable cause for these symptoms. : The need for true healthcare reform and removing greedy insurance companies from medical decisions    History of insomnia and using the ambien as needed usually 2-3 times per week.  Last filled 6/6/22.  Has tried amitriptyline 50 mg, doxepin 10 mg and melatonin with no relief but no side effects.  Belsomra not covered by insurance and to expensive.  Denies anxiety, panic attacks or other issues.      Follow-up for her hypertension.  Currently, they have been feeling well and asymptomatic without any headaches, cough, chest pain, shortness of breath, swelling, focal neurologic deficit , memory loss or syncope.  Has been taking the medications regularly and adherent with the regimen of amlodipine 10 mg, losartan 100 mg, metoprolol succinate  mg and triamterene-HCTC 37.5-25 daily.  Denies medication side effects and no significant interval events.       Follow-up for thyroid.  Denies fatigue, weakness, constipation/diarrhea, hair/skin changes, weight gain/loss, depression/anxiety, rashes, palpitations, swelling, chest pain, shortness of breath or other issues.  Has been compliant with taking the medication of levothyroxine 100 mcg daily with no recent changes.  Denies any difficulty with the current medication of levothyroxine 100 mcg  daily.  Last labs performed on 7/29/22.     Follow-up for the testosterone.  Doing 2 ml every 3 weeks.  Last injection about 1.5 weeks ago.  No difficulty with mood, anger, urinating or with BM.  Last testosterone level and PSA 7/29/2022 that was very low.    The following portions of the patient's history were reviewed and updated as appropriate: allergies, current medications, past family history, past medical history, past social history, past surgical history and problem list.    Depression Screen:  PHQ-2/PHQ-9 Depression Screening 10/25/2022   Retired Total Score -   Little Interest or Pleasure in Doing Things 0-->not at all   Feeling Down, Depressed or Hopeless 0-->not at all   PHQ-9: Brief Depression Severity Measure Score 0       Past Medical History:   Diagnosis Date   • Allergic forever    flonase and over the counter   • Allergic rhinitis    • Anemia    • Arthritis 2000    Stems from back injury at work and surgery   • BMI 50.0-59.9, adult (HCC)    • Elevated alkaline phosphatase level    • Encounter for immunization    • Fatigue    • Fever    • Gastritis    • GERD (gastroesophageal reflux disease)    • Hypertension    • Hypogonadism male    • Hypothyroidism    • Low back pain    • Morbid obesity (HCC)    • Pneumonia    • Visit for annual health examination    • Visual impairment     Wear glasses or contacts       Past Surgical History:   Procedure Laterality Date   • BACK SURGERY  08/2009   • BARIATRIC SURGERY  1990s   • COLONOSCOPY  10/2014    diverticulosis, internal hemorrhoids and polyps repeat in 3 years   • LAMINECTOMY  2011    Decompress, Facetectomy, Foraminotomy Lumbar Seg • L4-S1    • SHOULDER SURGERY  10/2012   • SPINE SURGERY  2011?   • TONSILLECTOMY  1970       Family History   Adopted: Yes       Social History     Socioeconomic History   • Marital status: Single   Tobacco Use   • Smoking status: Never   • Smokeless tobacco: Never   • Tobacco comments:     never used   Substance and Sexual  "Activity   • Alcohol use: Never   • Drug use: Never   • Sexual activity: Not Currently     Partners: Female     Birth control/protection: Abstinence       Current Outpatient Medications   Medication Sig Dispense Refill   • amLODIPine (NORVASC) 10 MG tablet Take 1 tablet by mouth Daily. 90 tablet 1   • fluticasone (FLONASE) 50 MCG/ACT nasal spray 2 sprays by Each Nare route Daily. 48 mL 2   • ibuprofen (ADVIL,MOTRIN) 800 MG tablet Take 1 tablet by mouth Every 6 (Six) Hours As Needed for Mild Pain. 60 tablet 1   • levothyroxine (SYNTHROID, LEVOTHROID) 100 MCG tablet TAKE ONE TABLET BY MOUTH DAILY 30 tablet 1   • losartan (COZAAR) 100 MG tablet Take 1 tablet by mouth Daily. 90 tablet 3   • metoprolol succinate XL (TOPROL-XL) 200 MG 24 hr tablet Take 1 tablet by mouth Daily. 90 tablet 3   • Syringe 20G X 1-1/2\" 3 ML misc Use as directed with testosterone injection every 1 to 4 weeks as ordered. 50 each 0   • Testosterone Cypionate (DEPOTESTOTERONE CYPIONATE) 200 MG/ML injection Inject 1 mL into the appropriate muscle as directed by prescriber Every 21 (Twenty-One) Days. 10 mL 1   • zolpidem (AMBIEN) 5 MG tablet Take 1 tablet by mouth At Night As Needed for Sleep. 30 tablet 2     No current facility-administered medications for this visit.       Review of Systems   Constitutional: Negative for activity change, appetite change, fatigue, fever, unexpected weight gain and unexpected weight loss.   HENT: Negative for nosebleeds, rhinorrhea, trouble swallowing and voice change.    Eyes: Negative for visual disturbance.   Respiratory: Negative for cough, chest tightness, shortness of breath and wheezing.    Cardiovascular: Negative for chest pain, palpitations and leg swelling.   Gastrointestinal: Negative for abdominal pain, blood in stool, constipation, diarrhea, nausea, vomiting, GERD and indigestion.   Genitourinary: Negative for dysuria, frequency and hematuria.   Musculoskeletal: Positive for back pain. Negative for " "arthralgias and myalgias.   Skin: Negative for rash and wound.   Neurological: Negative for dizziness, tremors, weakness, light-headedness, numbness, headache and memory problem.   Hematological: Negative for adenopathy. Does not bruise/bleed easily.   Psychiatric/Behavioral: Negative for sleep disturbance and depressed mood. The patient is not nervous/anxious.        Objective   /78 (BP Location: Left arm, Patient Position: Sitting, Cuff Size: Large Adult)   Pulse 60   Temp 98 °F (36.7 °C) (Temporal)   Resp 16   Ht 177.8 cm (70\")   Wt (!) 163 kg (359 lb 6.4 oz)   SpO2 98%   BMI 51.57 kg/m²     Physical Exam  Vitals and nursing note reviewed.   Constitutional:       General: He is not in acute distress.     Appearance: He is well-developed. He is not diaphoretic.   HENT:      Head: Normocephalic and atraumatic.      Right Ear: External ear normal.      Left Ear: External ear normal.      Nose: Nose normal.   Eyes:      Conjunctiva/sclera: Conjunctivae normal.      Pupils: Pupils are equal, round, and reactive to light.   Neck:      Thyroid: No thyromegaly.      Trachea: No tracheal deviation.   Cardiovascular:      Rate and Rhythm: Normal rate and regular rhythm.      Heart sounds: Normal heart sounds. No murmur heard.    No friction rub. No gallop.   Pulmonary:      Effort: Pulmonary effort is normal. No respiratory distress.      Breath sounds: Normal breath sounds.   Abdominal:      General: Bowel sounds are normal.      Palpations: Abdomen is soft. There is no mass.      Tenderness: There is no abdominal tenderness. There is no guarding.   Musculoskeletal:         General: Normal range of motion.      Cervical back: Normal range of motion and neck supple.   Lymphadenopathy:      Cervical: No cervical adenopathy.   Skin:     General: Skin is warm and dry.      Capillary Refill: Capillary refill takes less than 2 seconds.      Findings: No rash.   Neurological:      Mental Status: He is alert and " oriented to person, place, and time.      Motor: No abnormal muscle tone.      Deep Tendon Reflexes: Reflexes normal.   Psychiatric:         Behavior: Behavior normal.         Thought Content: Thought content normal.         Judgment: Judgment normal.         No results found for this or any previous visit (from the past 2016 hour(s)).  Assessment & Plan   Diagnoses and all orders for this visit:    1. Essential hypertension  -     amLODIPine (NORVASC) 10 MG tablet; Take 1 tablet by mouth Daily.  Dispense: 90 tablet; Refill: 1    2. Lumbar back pain  -     ibuprofen (ADVIL,MOTRIN) 800 MG tablet; Take 1 tablet by mouth Every 6 (Six) Hours As Needed for Mild Pain.  Dispense: 60 tablet; Refill: 1    3. Primary insomnia  -     zolpidem (AMBIEN) 5 MG tablet; Take 1 tablet by mouth At Night As Needed for Sleep.  Dispense: 30 tablet; Refill: 2    4. Hypogonadism male  -     Testosterone Cypionate (DEPOTESTOTERONE CYPIONATE) 200 MG/ML injection; Inject 1 mL into the appropriate muscle as directed by prescriber Every 21 (Twenty-One) Days.  Dispense: 10 mL; Refill: 1    Hypertension is currently well controlled.  He does have some low back pain that is stable and chronic continuing the ibuprofen.  We discussed the Ambien for his sleep which he should be trying to take as little as possible as needed only.  He can even try and take a half a tablet which she is understanding.  He also has the hypogonadism with testosterone deficiency.  Continuing the injections and patient will follow-up in the office on next visit in 3 to 4 months and we will do all the labs at that time.  Patient is understanding.  Jose reviewed and discussed with patient.  He understands the risk of his medications as reviewed.           · COVID-19 Precautions - Patient was compliant in wearing a mask. When I saw the patient, I used appropriate personal protective equipment (PPE) including mask and eye shield (standard procedure).  Additionally, I used  gown and gloves if indicated.  Hand hygiene was completed before and after seeing the patient.  · Dictated utilizing Dragon Dictation

## 2023-04-06 DIAGNOSIS — I10 ESSENTIAL HYPERTENSION: ICD-10-CM

## 2023-04-06 RX ORDER — TRIAMTERENE AND HYDROCHLOROTHIAZIDE 37.5; 25 MG/1; MG/1
TABLET ORAL
Qty: 90 TABLET | Refills: 2 | OUTPATIENT
Start: 2023-04-06

## 2023-04-06 NOTE — TELEPHONE ENCOUNTER
The original prescription was discontinued on 3/21/2023 by Shashi Lester MD for the following reason: Historical Med - Therapy completed. Renewing this prescription may not be appropriate.

## 2023-04-12 DIAGNOSIS — E03.9 HYPOTHYROIDISM, UNSPECIFIED TYPE: ICD-10-CM

## 2023-04-12 RX ORDER — LEVOTHYROXINE SODIUM 0.1 MG/1
TABLET ORAL
Qty: 30 TABLET | Refills: 1 | Status: SHIPPED | OUTPATIENT
Start: 2023-04-12

## 2023-04-12 NOTE — TELEPHONE ENCOUNTER
Rx Refill Note  Requested Prescriptions     Pending Prescriptions Disp Refills   • Testosterone Cypionate (DEPOTESTOTERONE CYPIONATE) 200 MG/ML injection [Pharmacy Med Name: TESTOSTERONE  MG/ML SDV] 10 mL 1     Sig: INJECT ONE MILLILITER INTRAMUSCULARLY DAILY      Last office visit with prescribing clinician: 7/28/2021      Next office visit with prescribing clinician: 1/12/2022   Last filled 8/6/21           Priyank Coronel  09/10/21, 07:58 EDT  
decreased functional mobility

## 2023-05-22 DIAGNOSIS — F51.01 PRIMARY INSOMNIA: ICD-10-CM

## 2023-05-22 RX ORDER — ZOLPIDEM TARTRATE 5 MG/1
5 TABLET ORAL NIGHTLY PRN
Qty: 30 TABLET | Refills: 0 | Status: SHIPPED | OUTPATIENT
Start: 2023-05-22

## 2023-06-10 DIAGNOSIS — E03.9 HYPOTHYROIDISM, UNSPECIFIED TYPE: ICD-10-CM

## 2023-06-12 RX ORDER — LEVOTHYROXINE SODIUM 0.1 MG/1
TABLET ORAL
Qty: 30 TABLET | Refills: 1 | Status: SHIPPED | OUTPATIENT
Start: 2023-06-12

## 2023-06-19 DIAGNOSIS — E66.01 OBESITY, MORBID, BMI 50 OR HIGHER: Primary | ICD-10-CM

## 2023-07-11 PROBLEM — L40.9 PSORIASIS: Status: ACTIVE | Noted: 2023-07-11

## 2023-07-25 ENCOUNTER — PRIOR AUTHORIZATION (OUTPATIENT)
Dept: FAMILY MEDICINE CLINIC | Facility: CLINIC | Age: 58
End: 2023-07-25
Payer: COMMERCIAL

## 2023-08-09 DIAGNOSIS — E03.9 HYPOTHYROIDISM, UNSPECIFIED TYPE: ICD-10-CM

## 2023-08-09 RX ORDER — LEVOTHYROXINE SODIUM 0.1 MG/1
TABLET ORAL
Qty: 30 TABLET | Refills: 1 | Status: SHIPPED | OUTPATIENT
Start: 2023-08-09

## 2023-08-19 DIAGNOSIS — M54.50 LUMBAR BACK PAIN: ICD-10-CM

## 2023-08-19 DIAGNOSIS — F51.01 PRIMARY INSOMNIA: ICD-10-CM

## 2023-08-21 RX ORDER — IBUPROFEN 800 MG/1
800 TABLET ORAL EVERY 6 HOURS PRN
Qty: 60 TABLET | Refills: 1 | Status: SHIPPED | OUTPATIENT
Start: 2023-08-21

## 2023-08-21 RX ORDER — ZOLPIDEM TARTRATE 5 MG/1
5 TABLET ORAL NIGHTLY PRN
Qty: 30 TABLET | Refills: 1 | Status: SHIPPED | OUTPATIENT
Start: 2023-08-21

## 2023-09-16 DIAGNOSIS — F51.01 PRIMARY INSOMNIA: ICD-10-CM

## 2023-09-18 RX ORDER — ZOLPIDEM TARTRATE 5 MG/1
5 TABLET ORAL NIGHTLY PRN
Qty: 30 TABLET | Refills: 1 | Status: SHIPPED | OUTPATIENT
Start: 2023-09-18

## 2023-10-05 DIAGNOSIS — E03.9 HYPOTHYROIDISM, UNSPECIFIED TYPE: ICD-10-CM

## 2023-10-05 RX ORDER — LEVOTHYROXINE SODIUM 0.1 MG/1
TABLET ORAL
Qty: 60 TABLET | Refills: 1 | Status: SHIPPED | OUTPATIENT
Start: 2023-10-05

## 2023-10-10 DIAGNOSIS — M54.50 LUMBAR BACK PAIN: ICD-10-CM

## 2023-10-10 DIAGNOSIS — E29.1 HYPOGONADISM MALE: ICD-10-CM

## 2023-10-10 RX ORDER — IBUPROFEN 800 MG/1
800 TABLET ORAL EVERY 6 HOURS PRN
Qty: 60 TABLET | Refills: 0 | Status: SHIPPED | OUTPATIENT
Start: 2023-10-10

## 2023-10-10 RX ORDER — TESTOSTERONE CYPIONATE 200 MG/ML
200 INJECTION, SOLUTION INTRAMUSCULAR
Qty: 10 ML | Refills: 1 | Status: SHIPPED | OUTPATIENT
Start: 2023-10-10

## 2023-10-31 DIAGNOSIS — I10 ESSENTIAL HYPERTENSION: ICD-10-CM

## 2023-10-31 RX ORDER — AMLODIPINE BESYLATE 10 MG/1
10 TABLET ORAL DAILY
Qty: 90 TABLET | Refills: 1 | Status: SHIPPED | OUTPATIENT
Start: 2023-10-31

## 2023-11-07 ENCOUNTER — OFFICE VISIT (OUTPATIENT)
Dept: FAMILY MEDICINE CLINIC | Facility: CLINIC | Age: 58
End: 2023-11-07
Payer: COMMERCIAL

## 2023-11-07 VITALS
WEIGHT: 315 LBS | SYSTOLIC BLOOD PRESSURE: 146 MMHG | HEART RATE: 70 BPM | DIASTOLIC BLOOD PRESSURE: 84 MMHG | HEIGHT: 70 IN | BODY MASS INDEX: 45.1 KG/M2 | TEMPERATURE: 97.7 F | OXYGEN SATURATION: 91 %

## 2023-11-07 DIAGNOSIS — R73.9 HYPERGLYCEMIA: ICD-10-CM

## 2023-11-07 DIAGNOSIS — F51.01 PRIMARY INSOMNIA: ICD-10-CM

## 2023-11-07 DIAGNOSIS — F51.01 PRIMARY INSOMNIA: Primary | ICD-10-CM

## 2023-11-07 DIAGNOSIS — E66.01 OBESITY, MORBID, BMI 50 OR HIGHER: ICD-10-CM

## 2023-11-07 RX ORDER — ZOLPIDEM TARTRATE 5 MG/1
5 TABLET ORAL NIGHTLY PRN
Qty: 30 TABLET | Refills: 1 | Status: SHIPPED | OUTPATIENT
Start: 2023-11-07

## 2023-11-07 RX ORDER — DULAGLUTIDE 0.75 MG/.5ML
0.75 INJECTION, SOLUTION SUBCUTANEOUS WEEKLY
Qty: 2 ML | Refills: 2 | Status: SHIPPED | OUTPATIENT
Start: 2023-11-07

## 2023-11-07 NOTE — PROGRESS NOTES
"Chief Complaint  Insomnia and Med Refill (Request refill of ambien)    Subjective        Ryley Heller presents to [facility]  Insomnia      Patient is here to get refills of ambien. He states the ambien works really well, and has tried multiple medications including melatonin and some other medications. He has been taking ambien for some time with positive results.     Wegovy still has not been in stock to start. He is frustrated due to his inability to lose weight on his own. He has never been tested for diabetes.  He is concerned for his weight and being diagnosed with diabetes in the future. Has had some higher blood sugars in the past.   Objective   Vital Signs:  /84 (BP Location: Left arm, Patient Position: Sitting, Cuff Size: Large Adult)   Pulse 70   Temp 97.7 °F (36.5 °C) (Temporal)   Ht 177.8 cm (70\")   Wt (!) 166 kg (366 lb 6.4 oz)   SpO2 91%   BMI 52.57 kg/m²   Estimated body mass index is 52.57 kg/m² as calculated from the following:    Height as of this encounter: 177.8 cm (70\").    Weight as of this encounter: 166 kg (366 lb 6.4 oz).               Physical Exam  Constitutional:       Appearance: Normal appearance. He is obese.   HENT:      Head: Normocephalic.   Eyes:      Extraocular Movements: Extraocular movements intact.      Pupils: Pupils are equal, round, and reactive to light.   Cardiovascular:      Rate and Rhythm: Normal rate and regular rhythm.      Heart sounds: Normal heart sounds.   Pulmonary:      Effort: Pulmonary effort is normal.      Breath sounds: Normal breath sounds.   Musculoskeletal:         General: Normal range of motion.      Cervical back: Normal range of motion.   Skin:     General: Skin is warm and dry.   Neurological:      General: No focal deficit present.      Mental Status: He is alert and oriented to person, place, and time.   Psychiatric:         Mood and Affect: Mood normal.        Result Review :    CMP          " 7/11/2023    09:15   CMP   Glucose 106    BUN 14    Creatinine 1.13    Sodium 144    Potassium 4.5    Chloride 106    Calcium 9.4    Total Protein 6.9    Albumin 4.2    Globulin 2.7    Total Bilirubin 0.9    Alkaline Phosphatase 179    AST (SGOT) 26    ALT (SGPT) 34    BUN/Creatinine Ratio 12.4                   Assessment and Plan   Diagnoses and all orders for this visit:    1. Primary insomnia (Primary)    2. Obesity, morbid, BMI 50 or higher    3. Hyperglycemia  -     Hemoglobin A1c  -     Dulaglutide (Trulicity) 0.75 MG/0.5ML solution pen-injector; Inject 0.75 mg under the skin into the appropriate area as directed 1 (One) Time Per Week.  Dispense: 2 mL; Refill: 2    Discussed with patient the need for long term diet changes as well as medication to assist in the weight loss.     Will try to get trulicity approved for therapy use until wegovy can be available.          Follow Up   Return if symptoms worsen or fail to improve.  Patient was given instructions and counseling regarding his condition or for health maintenance advice. Please see specific information pulled into the AVS if appropriate.

## 2023-11-08 LAB — HBA1C MFR BLD: 6.6 % (ref 4.8–5.6)

## 2023-11-09 ENCOUNTER — TELEPHONE (OUTPATIENT)
Dept: FAMILY MEDICINE CLINIC | Facility: CLINIC | Age: 58
End: 2023-11-09
Payer: COMMERCIAL

## 2023-11-09 DIAGNOSIS — E11.65 TYPE 2 DIABETES MELLITUS WITH HYPERGLYCEMIA, WITHOUT LONG-TERM CURRENT USE OF INSULIN: Primary | ICD-10-CM

## 2023-11-09 NOTE — TELEPHONE ENCOUNTER
Spoke with patient, informed him that the Trulicity was approved and to schedule appointment in 3 months, I told him I didn't know if Ozempic is in stock anywhere from when his PCP prescribed it in june 2023 and to purchase whichever medication is cost effective for him either the metformin or trulicity but don''t take both. Patient voiced understanding.

## 2023-11-09 NOTE — TELEPHONE ENCOUNTER
LMTCB    **HUB/FO** MAY RELAY MESSAGE      ----- Message from SHEBA Morgan sent at 11/9/2023 10:01 AM EST -----  Please call and let patient know that his A1c did indicate he has blood sugars high enough to be diagnosed with diabetes type 2. I am going to start him on medication called metformin. If he changes his diet to lower sugar and carb intake and increase exercise, this medication should help him with his weight loss goals. At 3 month follow up we can discuss adding additional medications. Also let him know the PA for the trulicity was not approved and we can try again for that medicine or anther injectable once he's been on metformin for 3 months. Thanks.    **Let the patient know the TRULICITY prior authorization was APPROVED since I resubmitted it under his new diagnosis of diabetes and PER YUNG he can either take the metformin or the Trulicity whichever is more cost effective for him both DO NOT take both **

## 2023-11-09 NOTE — TELEPHONE ENCOUNTER
"Name: Ryley Heller \"Jadon\"    Relationship: Self    Best Callback Number: [phone]    HUB PROVIDED THE RELAY MESSAGE FROM THE OFFICE   PATIENT HAS FURTHER QUESTIONS AND WOULD LIKE A CALL BACK AT THE FOLLOWING PHONE NUMBER [phone]    ADDITIONAL INFORMATION: PATIENT STATES HE IS WANTING TO KNOW IF OZEMPIC WOULD BE EASIER FOR HIM TO GET.     PATIENT STATES HE WOULD LIKE TO GET THE OZEMPIC MEDICATION .     PATIENT IS REQUESTING A CALL BACK.     "

## 2023-12-04 DIAGNOSIS — M54.50 LUMBAR BACK PAIN: ICD-10-CM

## 2023-12-04 DIAGNOSIS — F51.01 PRIMARY INSOMNIA: ICD-10-CM

## 2023-12-04 RX ORDER — IBUPROFEN 800 MG/1
800 TABLET ORAL EVERY 6 HOURS PRN
Qty: 60 TABLET | Refills: 0 | Status: SHIPPED | OUTPATIENT
Start: 2023-12-04

## 2023-12-04 RX ORDER — ZOLPIDEM TARTRATE 5 MG/1
5 TABLET ORAL NIGHTLY PRN
Qty: 30 TABLET | Refills: 1 | OUTPATIENT
Start: 2023-12-04

## 2024-01-02 DIAGNOSIS — E66.01 OBESITY, MORBID, BMI 50 OR HIGHER: ICD-10-CM

## 2024-01-02 DIAGNOSIS — E11.65 TYPE 2 DIABETES MELLITUS WITH HYPERGLYCEMIA, WITHOUT LONG-TERM CURRENT USE OF INSULIN: Primary | ICD-10-CM

## 2024-01-04 DIAGNOSIS — F51.01 PRIMARY INSOMNIA: ICD-10-CM

## 2024-01-05 ENCOUNTER — TELEPHONE (OUTPATIENT)
Dept: FAMILY MEDICINE CLINIC | Facility: CLINIC | Age: 59
End: 2024-01-05
Payer: COMMERCIAL

## 2024-01-09 RX ORDER — ZOLPIDEM TARTRATE 5 MG/1
5 TABLET ORAL NIGHTLY PRN
Qty: 30 TABLET | Refills: 1 | Status: SHIPPED | OUTPATIENT
Start: 2024-01-09

## 2024-01-29 DIAGNOSIS — E03.9 HYPOTHYROIDISM, UNSPECIFIED TYPE: ICD-10-CM

## 2024-01-29 RX ORDER — LEVOTHYROXINE SODIUM 0.1 MG/1
TABLET ORAL
Qty: 60 TABLET | Refills: 1 | Status: SHIPPED | OUTPATIENT
Start: 2024-01-29

## 2024-02-06 DIAGNOSIS — R73.9 HYPERGLYCEMIA: ICD-10-CM

## 2024-02-06 DIAGNOSIS — E11.65 TYPE 2 DIABETES MELLITUS WITH HYPERGLYCEMIA, WITHOUT LONG-TERM CURRENT USE OF INSULIN: ICD-10-CM

## 2024-02-06 RX ORDER — DULAGLUTIDE 0.75 MG/.5ML
0.75 INJECTION, SOLUTION SUBCUTANEOUS
Qty: 2 ML | Refills: 2 | OUTPATIENT
Start: 2024-02-06

## 2024-02-19 ENCOUNTER — OFFICE VISIT (OUTPATIENT)
Dept: FAMILY MEDICINE CLINIC | Facility: CLINIC | Age: 59
End: 2024-02-19
Payer: COMMERCIAL

## 2024-02-19 VITALS
TEMPERATURE: 97.1 F | HEIGHT: 70 IN | DIASTOLIC BLOOD PRESSURE: 86 MMHG | WEIGHT: 315 LBS | SYSTOLIC BLOOD PRESSURE: 138 MMHG | BODY MASS INDEX: 45.1 KG/M2 | HEART RATE: 62 BPM | OXYGEN SATURATION: 95 %

## 2024-02-19 DIAGNOSIS — E11.65 TYPE 2 DIABETES MELLITUS WITH HYPERGLYCEMIA, WITHOUT LONG-TERM CURRENT USE OF INSULIN: Primary | ICD-10-CM

## 2024-02-19 DIAGNOSIS — E29.1 HYPOGONADISM MALE: ICD-10-CM

## 2024-02-19 DIAGNOSIS — F51.01 PRIMARY INSOMNIA: ICD-10-CM

## 2024-02-19 DIAGNOSIS — I10 PRIMARY HYPERTENSION: ICD-10-CM

## 2024-02-19 DIAGNOSIS — N18.30 CHRONIC RENAL INSUFFICIENCY, STAGE 3 (MODERATE): ICD-10-CM

## 2024-02-19 DIAGNOSIS — Z12.5 SPECIAL SCREENING, PROSTATE CANCER: ICD-10-CM

## 2024-02-19 DIAGNOSIS — M54.50 LUMBAR BACK PAIN: ICD-10-CM

## 2024-02-19 DIAGNOSIS — Z23 IMMUNIZATION DUE: ICD-10-CM

## 2024-02-19 DIAGNOSIS — Z51.81 ENCOUNTER FOR MEDICATION MONITORING: ICD-10-CM

## 2024-02-19 DIAGNOSIS — E66.01 OBESITY, MORBID, BMI 50 OR HIGHER: ICD-10-CM

## 2024-02-19 DIAGNOSIS — Z11.59 NEED FOR HEPATITIS C SCREENING TEST: ICD-10-CM

## 2024-02-19 DIAGNOSIS — E03.9 HYPOTHYROIDISM, UNSPECIFIED TYPE: ICD-10-CM

## 2024-02-19 NOTE — PROGRESS NOTES
Subjective   Ryley Heller is a 58 y.o. male.     Chief Complaint   Patient presents with    Hypertension    Diabetes    Back Pain     He is having pain in his back that is different then his normal pain. He says ibuprofen is not helping like it used to.        History of Present Illness   Answers submitted by the patient for this visit:  Primary Reason for Visit (Submitted on 2/18/2024)  What is the primary reason for your visit?: Other  Other (Submitted on 2/18/2024)  Please describe your symptoms.: Primary reason for visit is follow up on start of new med and 6 month blood work up.  Secondary is increase in back pain and I want to see what can be done.  Also want to see about switch from Ozempic to Wegovey and at higher dose.  Have you had these symptoms before?: Yes  How long have you been having these symptoms?: Greater than 2 weeks  Please list any medications you are currently taking for this condition.: Ozempic, Ibuphrofen.  Please describe any probable cause for these symptoms. : Previous injury and surgery along with progression of arthritis.    History of diabetes with an A1c of 6.6% on 11/7/2023.  Was started on metformin 5 mg but then patient discontinued.  Is actually utilizing the Ozempic 1 mg weekly.  No side effects of the medication and tolerating.  Here for follow-up today.  Has lost weight of 42 pounds.    History of insomnia and using the ambien as needed usually 2-3 times per week.  Last filled 6/6/22.  Has tried amitriptyline 50 mg, doxepin 10 mg and melatonin with no relief but no side effects.  Belsomra not covered by insurance and to expensive.  Denies anxiety, panic attacks or other issues.      Follow-up for her hypertension.  Currently, they have been feeling well and asymptomatic without any headaches, cough, chest pain, shortness of breath, swelling, focal neurologic deficit , memory loss or syncope.  Has been taking the medications regularly and adherent with the regimen of  amlodipine 10 mg, losartan 100 mg, metoprolol succinate  mg and triamterene-HCTC 37.5-25 daily.  Denies medication side effects and no significant interval events.       Follow-up for thyroid.  Denies fatigue, weakness, constipation/diarrhea, hair/skin changes, weight gain/loss, depression/anxiety, rashes, palpitations, swelling, chest pain, shortness of breath or other issues.  Has been compliant with taking the medication of levothyroxine 100 mcg daily with no recent changes.  Denies any difficulty with the current medication of levothyroxine 100 mcg daily.  Last labs performed on 7/29/22.     Follow-up for the testosterone.  Doing 1 ml every 3 weeks.  Last injection about 10 days ago.  No difficulty with mood, anger, urinating or with BM.  Last testosterone level and PSA 7/29/2022 that was very low.    Has been having low back pain which is different from his normal pain.  Is been trying to use ibuprofen without any significant relief like he used to have in the past.    The following portions of the patient's history were reviewed and updated as appropriate: allergies, current medications, past family history, past medical history, past social history, past surgical history and problem list.    Depression Screen:      2/19/2024    10:50 AM   PHQ-2/PHQ-9 Depression Screening   Little Interest or Pleasure in Doing Things 0-->not at all   Feeling Down, Depressed or Hopeless 0-->not at all   PHQ-9: Brief Depression Severity Measure Score 0       Past Medical History:   Diagnosis Date    Allergic forever    flonase and over the counter    Allergic rhinitis     Anemia     Arthritis 2000    Stems from back injury at work and surgery    BMI 50.0-59.9, adult     Elevated alkaline phosphatase level     Encounter for immunization     Fatigue     Fever     Gastritis     GERD (gastroesophageal reflux disease)     Hypertension     Hypogonadism male     Hypothyroidism     Low back pain     Morbid obesity     Pneumonia   "   Visit for annual health examination     Visual impairment     Wear glasses or contacts       Past Surgical History:   Procedure Laterality Date    BACK SURGERY  08/2009    BARIATRIC SURGERY  1990s    COLONOSCOPY  10/2014    diverticulosis, internal hemorrhoids and polyps repeat in 3 years    LAMINECTOMY  2011    Decompress, Facetectomy, Foraminotomy Lumbar Seg  L4-S1     SHOULDER SURGERY  10/2012    SPINE SURGERY  2011?    TONSILLECTOMY  1970       Family History   Adopted: Yes       Social History     Socioeconomic History    Marital status: Single   Tobacco Use    Smoking status: Never    Smokeless tobacco: Never    Tobacco comments:     never used   Vaping Use    Vaping Use: Never used   Substance and Sexual Activity    Alcohol use: Never    Drug use: Never    Sexual activity: Not Currently     Partners: Female     Birth control/protection: Abstinence       Current Outpatient Medications   Medication Sig Dispense Refill    amLODIPine (NORVASC) 10 MG tablet TAKE ONE TABLET BY MOUTH DAILY 90 tablet 1    fluticasone (FLONASE) 50 MCG/ACT nasal spray 2 sprays by Each Nare route Daily. 48 mL 2    levothyroxine (SYNTHROID, LEVOTHROID) 100 MCG tablet TAKE 1 TABLET BY MOUTH DAILY 60 tablet 1    losartan (COZAAR) 100 MG tablet TAKE ONE TABLET BY MOUTH DAILY 90 tablet 3    metoprolol succinate XL (TOPROL-XL) 200 MG 24 hr tablet TAKE ONE TABLET BY MOUTH DAILY 90 tablet 3    mupirocin (BACTROBAN) 2 % cream Apply 1 application  topically to the appropriate area as directed 3 (Three) Times a Day. 30 g 0    Syringe 20G X 1-1/2\" 3 ML misc Use as directed with testosterone injection every 1 to 4 weeks as ordered. 50 each 0    Testosterone Cypionate (DEPOTESTOTERONE CYPIONATE) 200 MG/ML injection Inject 1 mL into the appropriate muscle as directed by prescriber Every 21 (Twenty-One) Days. 10 mL 1    triamcinolone (KENALOG) 0.1 % ointment Apply 1 application  topically to the appropriate area as directed 2 (Two) Times a Day. 30 g " "1    zolpidem (AMBIEN) 5 MG tablet Take 1 tablet by mouth At Night As Needed for Sleep. 30 tablet 1    diclofenac (VOLTAREN) 50 MG EC tablet Take 1 tablet by mouth 2 (Two) Times a Day As Needed (for back pain). Take with food 60 tablet 3    Semaglutide, 2 MG/DOSE, (OZEMPIC) 8 MG/3ML solution pen-injector Inject 2 mg under the skin into the appropriate area as directed 1 (One) Time Per Week. 3 mL 6     No current facility-administered medications for this visit.       Review of Systems   Constitutional:  Negative for activity change, appetite change, fatigue, fever, unexpected weight gain and unexpected weight loss.   HENT:  Negative for nosebleeds, rhinorrhea, trouble swallowing and voice change.    Eyes:  Negative for visual disturbance.   Respiratory:  Negative for cough, chest tightness, shortness of breath and wheezing.    Cardiovascular:  Negative for chest pain, palpitations and leg swelling.   Gastrointestinal:  Negative for abdominal pain, blood in stool, constipation, diarrhea, nausea, vomiting, GERD and indigestion.   Genitourinary:  Negative for dysuria, frequency and hematuria.   Musculoskeletal:  Positive for back pain. Negative for arthralgias and myalgias.   Skin:  Negative for rash and wound.   Neurological:  Negative for dizziness, tremors, weakness, light-headedness, numbness, headache and memory problem.   Hematological:  Negative for adenopathy. Does not bruise/bleed easily.   Psychiatric/Behavioral:  Negative for sleep disturbance and depressed mood. The patient is not nervous/anxious.        Objective   /86 (BP Location: Left arm, Patient Position: Sitting, Cuff Size: Large Adult)   Pulse 62   Temp 97.1 °F (36.2 °C) (Temporal)   Ht 177.8 cm (70\")   Wt (!) 150 kg (331 lb 9.6 oz)   SpO2 95%   BMI 47.58 kg/m²     Physical Exam  Vitals and nursing note reviewed.   Constitutional:       General: He is not in acute distress.     Appearance: He is well-developed. He is obese. He is not " diaphoretic.   HENT:      Head: Normocephalic and atraumatic.      Right Ear: External ear normal.      Left Ear: External ear normal.      Nose: Nose normal.   Eyes:      Conjunctiva/sclera: Conjunctivae normal.      Pupils: Pupils are equal, round, and reactive to light.   Neck:      Thyroid: No thyromegaly.      Trachea: No tracheal deviation.   Cardiovascular:      Rate and Rhythm: Normal rate and regular rhythm.      Heart sounds: Normal heart sounds. No murmur heard.     No friction rub. No gallop.   Pulmonary:      Effort: Pulmonary effort is normal. No respiratory distress.      Breath sounds: Normal breath sounds.   Abdominal:      General: Bowel sounds are normal.      Palpations: Abdomen is soft. There is no mass.      Tenderness: There is no abdominal tenderness. There is no guarding.   Musculoskeletal:         General: Normal range of motion.      Cervical back: Normal range of motion and neck supple.   Lymphadenopathy:      Cervical: No cervical adenopathy.   Skin:     General: Skin is warm and dry.      Capillary Refill: Capillary refill takes less than 2 seconds.      Findings: No rash.   Neurological:      Mental Status: He is alert and oriented to person, place, and time.      Motor: No abnormal muscle tone.      Deep Tendon Reflexes: Reflexes normal.   Psychiatric:         Behavior: Behavior normal.         Thought Content: Thought content normal.         Judgment: Judgment normal.         No results found for this or any previous visit (from the past 2016 hour(s)).  Assessment & Plan   Diagnoses and all orders for this visit:    1. Type 2 diabetes mellitus with hyperglycemia, without long-term current use of insulin (Primary)  -     Comprehensive Metabolic Panel  -     Lipid Panel  -     Hemoglobin A1c  -     Microalbumin / Creatinine Urine Ratio - Urine, Clean Catch  -     Semaglutide, 2 MG/DOSE, (OZEMPIC) 8 MG/3ML solution pen-injector; Inject 2 mg under the skin into the appropriate area as  directed 1 (One) Time Per Week.  Dispense: 3 mL; Refill: 6    2. Primary hypertension  -     Comprehensive Metabolic Panel  -     Lipid Panel    3. Chronic renal insufficiency, stage 3 (moderate)  -     Comprehensive Metabolic Panel    4. Primary insomnia  -     ToxASSURE Select 13 (MW) - Urine, Clean Catch    5. Hypothyroidism, unspecified type  -     TSH Rfx On Abnormal To Free T4    6. Hypogonadism male  -     Testosterone (Free & Total), LC / MS    7. Lumbar back pain  -     diclofenac (VOLTAREN) 50 MG EC tablet; Take 1 tablet by mouth 2 (Two) Times a Day As Needed (for back pain). Take with food  Dispense: 60 tablet; Refill: 3    8. Special screening, prostate cancer  -     PSA Screen    9. Encounter for medication monitoring  -     ToxASSURE Select 13 (MW) - Urine, Clean Catch    10. Obesity, morbid, BMI 50 or higher  -     Semaglutide, 2 MG/DOSE, (OZEMPIC) 8 MG/3ML solution pen-injector; Inject 2 mg under the skin into the appropriate area as directed 1 (One) Time Per Week.  Dispense: 3 mL; Refill: 6    11. Immunization due  -     Pneumococcal Conjugate Vaccine 20-Valent All  -     Shingrix Vaccine    12. Need for hepatitis C screening test  -     Hepatitis C Antibody    Type 2 diabetes and obesity.  Is doing well on the Ozempic.  Will continue the Ozempic but increase to 2 mg weekly.  Will continue to follow his blood pressure which is improving as well.  Checking the labs checking kidney function testosterone levels and thyroid.  PSA screen today.  We discussed immunizations and he will have his Prevnar 20 and Shingrix as well today.  He will follow-up with us in 4 months.           COVID-19 Precautions - Patient was compliant in wearing a mask. When I saw the patient, I used appropriate personal protective equipment (PPE) including mask and eye shield (standard procedure).  Additionally, I used gown and gloves if indicated.  Hand hygiene was completed before and after seeing the patient.  Dictated  utilizing Dragon Dictation

## 2024-02-28 LAB
ALBUMIN SERPL-MCNC: 4 G/DL (ref 3.8–4.9)
ALBUMIN/CREAT UR: 3 MG/G CREAT (ref 0–29)
ALBUMIN/GLOB SERPL: 1.7 {RATIO} (ref 1.2–2.2)
ALP SERPL-CCNC: 193 IU/L (ref 44–121)
ALT SERPL-CCNC: 24 IU/L (ref 0–44)
AST SERPL-CCNC: 21 IU/L (ref 0–40)
BILIRUB SERPL-MCNC: 0.7 MG/DL (ref 0–1.2)
BUN SERPL-MCNC: 17 MG/DL (ref 6–24)
BUN/CREAT SERPL: 15 (ref 9–20)
CALCIUM SERPL-MCNC: 9.2 MG/DL (ref 8.7–10.2)
CHLORIDE SERPL-SCNC: 102 MMOL/L (ref 96–106)
CHOLEST SERPL-MCNC: 123 MG/DL (ref 100–199)
CO2 SERPL-SCNC: 27 MMOL/L (ref 20–29)
CREAT SERPL-MCNC: 1.12 MG/DL (ref 0.76–1.27)
CREAT UR-MCNC: 95.9 MG/DL
DRUGS UR: NORMAL
EGFRCR SERPLBLD CKD-EPI 2021: 76 ML/MIN/1.73
GLOBULIN SER CALC-MCNC: 2.4 G/DL (ref 1.5–4.5)
GLUCOSE SERPL-MCNC: 83 MG/DL (ref 70–99)
HBA1C MFR BLD: 5.7 % (ref 4.8–5.6)
HCV IGG SERPL QL IA: NON REACTIVE
HDLC SERPL-MCNC: 31 MG/DL
LDLC SERPL CALC-MCNC: 75 MG/DL (ref 0–99)
MICROALBUMIN UR-MCNC: 3.2 UG/ML
POTASSIUM SERPL-SCNC: 4.6 MMOL/L (ref 3.5–5.2)
PROT SERPL-MCNC: 6.4 G/DL (ref 6–8.5)
PSA SERPL-MCNC: 0.2 NG/ML (ref 0–4)
SODIUM SERPL-SCNC: 142 MMOL/L (ref 134–144)
TESTOST FREE SERPL-MCNC: 2.7 PG/ML (ref 7.2–24)
TESTOST SERPL-MCNC: 357.8 NG/DL (ref 264–916)
TRIGL SERPL-MCNC: 90 MG/DL (ref 0–149)
TSH SERPL DL<=0.005 MIU/L-ACNC: 1.19 UIU/ML (ref 0.45–4.5)
VLDLC SERPL CALC-MCNC: 17 MG/DL (ref 5–40)

## 2024-03-05 DIAGNOSIS — F51.01 PRIMARY INSOMNIA: ICD-10-CM

## 2024-03-06 RX ORDER — ZOLPIDEM TARTRATE 5 MG/1
5 TABLET ORAL NIGHTLY PRN
Qty: 30 TABLET | Refills: 1 | Status: SHIPPED | OUTPATIENT
Start: 2024-03-06

## 2024-03-06 NOTE — TELEPHONE ENCOUNTER
LOV             2/19/2024   NOV             4 months (around 6/19/2024).   Last RF        1/9/24  Protocol       not met    MIKE Franks/LMR

## 2024-03-23 DIAGNOSIS — M54.50 LUMBAR BACK PAIN: ICD-10-CM

## 2024-03-25 ENCOUNTER — PRIOR AUTHORIZATION (OUTPATIENT)
Dept: FAMILY MEDICINE CLINIC | Facility: CLINIC | Age: 59
End: 2024-03-25
Payer: COMMERCIAL

## 2024-03-25 NOTE — TELEPHONE ENCOUNTER
A PA has been initiated for the patients testosterone and I am awaiting a decision.    KEY:[id number]    PA has been approved

## 2024-04-25 DIAGNOSIS — I10 ESSENTIAL HYPERTENSION: ICD-10-CM

## 2024-04-25 RX ORDER — AMLODIPINE BESYLATE 10 MG/1
10 TABLET ORAL DAILY
Qty: 90 TABLET | Refills: 0 | Status: SHIPPED | OUTPATIENT
Start: 2024-04-25

## 2024-05-05 DIAGNOSIS — F51.01 PRIMARY INSOMNIA: ICD-10-CM

## 2024-05-06 RX ORDER — ZOLPIDEM TARTRATE 5 MG/1
5 TABLET ORAL NIGHTLY PRN
Qty: 30 TABLET | Refills: 0 | Status: SHIPPED | OUTPATIENT
Start: 2024-05-06

## 2024-05-25 DIAGNOSIS — E03.9 HYPOTHYROIDISM, UNSPECIFIED TYPE: ICD-10-CM

## 2024-05-28 RX ORDER — LEVOTHYROXINE SODIUM 0.1 MG/1
TABLET ORAL
Qty: 60 TABLET | Refills: 0 | Status: SHIPPED | OUTPATIENT
Start: 2024-05-28

## 2024-06-01 DIAGNOSIS — F51.01 PRIMARY INSOMNIA: ICD-10-CM

## 2024-06-03 DIAGNOSIS — F51.01 PRIMARY INSOMNIA: ICD-10-CM

## 2024-06-03 RX ORDER — ZOLPIDEM TARTRATE 5 MG/1
5 TABLET ORAL NIGHTLY PRN
Qty: 30 TABLET | Refills: 0 | OUTPATIENT
Start: 2024-06-03

## 2024-06-03 RX ORDER — ZOLPIDEM TARTRATE 5 MG/1
5 TABLET ORAL NIGHTLY PRN
Qty: 30 TABLET | Refills: 0 | Status: SHIPPED | OUTPATIENT
Start: 2024-06-03

## 2024-06-12 DIAGNOSIS — I10 ESSENTIAL HYPERTENSION: ICD-10-CM

## 2024-06-12 RX ORDER — METOPROLOL SUCCINATE 200 MG/1
200 TABLET, EXTENDED RELEASE ORAL DAILY
Qty: 90 TABLET | Refills: 1 | Status: SHIPPED | OUTPATIENT
Start: 2024-06-12

## 2024-06-17 DIAGNOSIS — E29.1 HYPOGONADISM MALE: ICD-10-CM

## 2024-06-18 RX ORDER — TESTOSTERONE CYPIONATE 200 MG/ML
INJECTION, SOLUTION INTRAMUSCULAR
Qty: 4 ML | OUTPATIENT
Start: 2024-06-18

## 2024-06-21 DIAGNOSIS — E29.1 HYPOGONADISM MALE: ICD-10-CM

## 2024-06-21 RX ORDER — TESTOSTERONE CYPIONATE 200 MG/ML
INJECTION, SOLUTION INTRAMUSCULAR
Qty: 4 ML | OUTPATIENT
Start: 2024-06-21

## 2024-06-25 ENCOUNTER — OFFICE VISIT (OUTPATIENT)
Dept: FAMILY MEDICINE CLINIC | Facility: CLINIC | Age: 59
End: 2024-06-25
Payer: COMMERCIAL

## 2024-06-25 ENCOUNTER — HOSPITAL ENCOUNTER (OUTPATIENT)
Dept: GENERAL RADIOLOGY | Facility: HOSPITAL | Age: 59
Discharge: HOME | End: 2024-06-25
Admitting: INTERNAL MEDICINE
Payer: COMMERCIAL

## 2024-06-25 VITALS
WEIGHT: 285.8 LBS | HEIGHT: 70 IN | DIASTOLIC BLOOD PRESSURE: 74 MMHG | SYSTOLIC BLOOD PRESSURE: 132 MMHG | BODY MASS INDEX: 40.92 KG/M2 | OXYGEN SATURATION: 97 % | HEART RATE: 69 BPM

## 2024-06-25 DIAGNOSIS — E03.9 HYPOTHYROIDISM, UNSPECIFIED TYPE: ICD-10-CM

## 2024-06-25 DIAGNOSIS — Z23 IMMUNIZATION DUE: ICD-10-CM

## 2024-06-25 DIAGNOSIS — F51.01 PRIMARY INSOMNIA: ICD-10-CM

## 2024-06-25 DIAGNOSIS — R07.89 XYPHOIDALGIA: ICD-10-CM

## 2024-06-25 DIAGNOSIS — M54.50 LUMBAR BACK PAIN: ICD-10-CM

## 2024-06-25 DIAGNOSIS — N18.30 CHRONIC RENAL INSUFFICIENCY, STAGE 3 (MODERATE): ICD-10-CM

## 2024-06-25 DIAGNOSIS — E29.1 HYPOGONADISM MALE: ICD-10-CM

## 2024-06-25 DIAGNOSIS — E11.65 TYPE 2 DIABETES MELLITUS WITH HYPERGLYCEMIA, WITHOUT LONG-TERM CURRENT USE OF INSULIN: Primary | ICD-10-CM

## 2024-06-25 PROCEDURE — 90471 IMMUNIZATION ADMIN: CPT | Performed by: INTERNAL MEDICINE

## 2024-06-25 PROCEDURE — 90750 HZV VACC RECOMBINANT IM: CPT | Performed by: INTERNAL MEDICINE

## 2024-06-25 PROCEDURE — 71120 X-RAY EXAM BREASTBONE 2/>VWS: CPT

## 2024-06-25 PROCEDURE — 99214 OFFICE O/P EST MOD 30 MIN: CPT | Performed by: INTERNAL MEDICINE

## 2024-06-25 RX ORDER — TESTOSTERONE CYPIONATE 200 MG/ML
200 INJECTION, SOLUTION INTRAMUSCULAR
Qty: 10 ML | Refills: 1 | Status: SHIPPED | OUTPATIENT
Start: 2024-06-25

## 2024-06-25 RX ORDER — CELECOXIB 200 MG/1
200 CAPSULE ORAL DAILY
Qty: 30 CAPSULE | Refills: 3 | Status: SHIPPED | OUTPATIENT
Start: 2024-06-25

## 2024-06-25 RX ORDER — ZOLPIDEM TARTRATE 5 MG/1
5 TABLET ORAL NIGHTLY PRN
Qty: 30 TABLET | Refills: 3 | Status: SHIPPED | OUTPATIENT
Start: 2024-06-25

## 2024-06-25 NOTE — PROGRESS NOTES
Subjective   Ryley Heller is a 59 y.o. male.     Chief Complaint   Patient presents with    Diabetes    Hypertension    Med Refill     Ambien and testosterone   Answers submitted by the patient for this visit:  Primary Reason for Visit (Submitted on 6/18/2024)  What is the primary reason for your visit?: Other  Other (Submitted on 6/18/2024)  Please describe your symptoms.: 2nd shingles shot, back pain and odd lump on sternum  Have you had these symptoms before?: Yes  How long have you been having these symptoms?: Greater than 2 weeks  Please list any medications you are currently taking for this condition.: Diclofenac 50 MG ec    History of Present Illness   History of diabetes with an A1c of 6.6% on 11/7/2023.  Was started on metformin 5 mg but then patient discontinued.  Is actually utilizing the Ozempic 1 mg weekly.  No side effects of the medication and tolerating.  Last A1c 2/19/24 of 5.7%.  Here for follow-up today.  Has lost weight of 88 pounds in the last year.  Is following diet and exercise.     History of insomnia and using the ambien as needed usually 2-3 times per week.  Last filled 6/6/22.  Has tried amitriptyline 50 mg, doxepin 10 mg and melatonin with no relief but no side effects.  Belsomra not covered by insurance and to expensive.  Denies anxiety, panic attacks or other issues.      Follow-up for her hypertension.  Currently, they have been feeling well and asymptomatic without any headaches, cough, chest pain, shortness of breath, swelling, focal neurologic deficit , memory loss or syncope.  Has been taking the medications regularly and adherent with the regimen of amlodipine 10 mg, losartan 100 mg, metoprolol succinate  mg and triamterene-HCTC 37.5-25 daily.  Denies medication side effects and no significant interval events.       Follow-up for thyroid.  Denies fatigue, weakness, constipation/diarrhea, hair/skin changes, weight gain/loss, depression/anxiety, rashes, palpitations,  swelling, chest pain, shortness of breath or other issues.  Has been compliant with taking the medication of levothyroxine 100 mcg daily with no recent changes.  Denies any difficulty with the current medication of levothyroxine 100 mcg daily.  Last labs performed on 2/19/24.     Follow-up for the testosterone.  Doing 1 ml every 3 weeks.  Last injection about 10 days ago.  No difficulty with mood, anger, urinating or with BM.  Last testosterone level and PSA 2/19/24 that was very low.     Has been having low back pain which is different from his normal pain.  Has tried to use ibuprofen, diclofenac without any significant relief like he used to have in the past.  Pain can at times be severe and cannot stand straight up.  Weight loss has not seemed to help.     The following portions of the patient's history were reviewed and updated as appropriate: allergies, current medications, past family history, past medical history, past social history, past surgical history and problem list.    Depression Screen:      2/19/2024    10:50 AM   PHQ-2/PHQ-9 Depression Screening   Little Interest or Pleasure in Doing Things 0-->not at all   Feeling Down, Depressed or Hopeless 0-->not at all   PHQ-9: Brief Depression Severity Measure Score 0       Past Medical History:   Diagnosis Date    Allergic forever    flonase and over the counter    Allergic rhinitis     Anemia     Arthritis 2000    Stems from back injury at work and surgery    BMI 50.0-59.9, adult     Elevated alkaline phosphatase level     Encounter for immunization     Fatigue     Fever     Gastritis     GERD (gastroesophageal reflux disease)     Hypertension     Hypogonadism male     Hypothyroidism     Low back pain     Morbid obesity     Pneumonia     Visit for annual health examination     Visual impairment     Wear glasses or contacts       Past Surgical History:   Procedure Laterality Date    BACK SURGERY  08/2009    BARIATRIC SURGERY  1990s    COLONOSCOPY  10/2014     "diverticulosis, internal hemorrhoids and polyps repeat in 3 years    LAMINECTOMY  2011    Decompress, Facetectomy, Foraminotomy Lumbar Seg  L4-S1     SHOULDER SURGERY  10/2012    SPINE SURGERY  2011?    TONSILLECTOMY  1970       Family History   Adopted: Yes       Social History     Socioeconomic History    Marital status: Single   Tobacco Use    Smoking status: Never    Smokeless tobacco: Never    Tobacco comments:     never used   Vaping Use    Vaping status: Never Used   Substance and Sexual Activity    Alcohol use: Never    Drug use: Never    Sexual activity: Not Currently     Partners: Female     Birth control/protection: Abstinence       Current Outpatient Medications   Medication Sig Dispense Refill    amLODIPine (NORVASC) 10 MG tablet TAKE 1 TABLET BY MOUTH DAILY 90 tablet 0    fluticasone (FLONASE) 50 MCG/ACT nasal spray 2 sprays by Each Nare route Daily. 48 mL 2    levothyroxine (SYNTHROID, LEVOTHROID) 100 MCG tablet TAKE 1 TABLET BY MOUTH DAILY 60 tablet 0    losartan (COZAAR) 100 MG tablet TAKE ONE TABLET BY MOUTH DAILY 90 tablet 3    metoprolol succinate XL (TOPROL-XL) 200 MG 24 hr tablet TAKE 1 TABLET BY MOUTH DAILY 90 tablet 1    mupirocin (BACTROBAN) 2 % cream Apply 1 application  topically to the appropriate area as directed 3 (Three) Times a Day. 30 g 0    Semaglutide, 2 MG/DOSE, (OZEMPIC) 8 MG/3ML solution pen-injector Inject 2 mg under the skin into the appropriate area as directed 1 (One) Time Per Week. 3 mL 6    Syringe 20G X 1-1/2\" 3 ML misc Use as directed with testosterone injection every 1 to 4 weeks as ordered. 50 each 0    Testosterone Cypionate (DEPOTESTOTERONE CYPIONATE) 200 MG/ML injection Inject 1 mL into the appropriate muscle as directed by prescriber Every 21 (Twenty-One) Days. 10 mL 1    triamcinolone (KENALOG) 0.1 % ointment Apply 1 application  topically to the appropriate area as directed 2 (Two) Times a Day. 30 g 1    zolpidem (AMBIEN) 5 MG tablet Take 1 tablet by mouth At " "Night As Needed for Sleep. for sleep 30 tablet 3    celecoxib (CeleBREX) 200 MG capsule Take 1 capsule by mouth Daily. 30 capsule 3     No current facility-administered medications for this visit.       Review of Systems   Constitutional:  Negative for activity change, appetite change, fatigue, fever, unexpected weight gain and unexpected weight loss.   HENT:  Negative for nosebleeds, rhinorrhea, trouble swallowing and voice change.    Eyes:  Negative for visual disturbance.   Respiratory:  Negative for cough, chest tightness, shortness of breath and wheezing.    Cardiovascular:  Negative for chest pain, palpitations and leg swelling.   Gastrointestinal:  Negative for abdominal pain, blood in stool, constipation, diarrhea, nausea, vomiting, GERD and indigestion.   Genitourinary:  Negative for dysuria, frequency and hematuria.   Musculoskeletal:  Negative for arthralgias, back pain and myalgias.   Skin:  Negative for rash and wound.   Neurological:  Negative for dizziness, tremors, weakness, light-headedness, numbness, headache and memory problem.   Hematological:  Negative for adenopathy. Does not bruise/bleed easily.   Psychiatric/Behavioral:  Negative for sleep disturbance and depressed mood. The patient is not nervous/anxious.        Objective   /74 (BP Location: Left arm, Patient Position: Sitting, Cuff Size: Large Adult)   Pulse 69   Ht 177.8 cm (70\")   Wt 130 kg (285 lb 12.8 oz)   SpO2 97%   BMI 41.01 kg/m²     Physical Exam  Vitals and nursing note reviewed.   Constitutional:       General: He is not in acute distress.     Appearance: He is well-developed. He is not diaphoretic.   HENT:      Head: Normocephalic and atraumatic.      Right Ear: External ear normal.      Left Ear: External ear normal.      Nose: Nose normal.   Eyes:      Conjunctiva/sclera: Conjunctivae normal.      Pupils: Pupils are equal, round, and reactive to light.   Neck:      Thyroid: No thyromegaly.      Trachea: No tracheal " deviation.   Cardiovascular:      Rate and Rhythm: Normal rate and regular rhythm.      Heart sounds: Normal heart sounds. No murmur heard.     No friction rub. No gallop.   Pulmonary:      Effort: Pulmonary effort is normal. No respiratory distress.      Breath sounds: Normal breath sounds.   Abdominal:      General: Bowel sounds are normal.      Palpations: Abdomen is soft. There is no mass.      Tenderness: There is no abdominal tenderness. There is no guarding.   Musculoskeletal:         General: Normal range of motion.      Cervical back: Normal range of motion and neck supple.   Lymphadenopathy:      Cervical: No cervical adenopathy.   Skin:     General: Skin is warm and dry.      Capillary Refill: Capillary refill takes less than 2 seconds.      Findings: No rash.   Neurological:      Mental Status: He is alert and oriented to person, place, and time.      Motor: No abnormal muscle tone.      Deep Tendon Reflexes: Reflexes normal.   Psychiatric:         Behavior: Behavior normal.         Thought Content: Thought content normal.         Judgment: Judgment normal.         No results found for this or any previous visit (from the past 2016 hour(s)).  Assessment & Plan   Diagnoses and all orders for this visit:    1. Type 2 diabetes mellitus with hyperglycemia, without long-term current use of insulin (Primary)  -     Comprehensive Metabolic Panel  -     Lipid Panel  -     Hemoglobin A1c    2. Hypogonadism male  -     Testosterone Cypionate (DEPOTESTOTERONE CYPIONATE) 200 MG/ML injection; Inject 1 mL into the appropriate muscle as directed by prescriber Every 21 (Twenty-One) Days.  Dispense: 10 mL; Refill: 1    3. Primary insomnia  -     zolpidem (AMBIEN) 5 MG tablet; Take 1 tablet by mouth At Night As Needed for Sleep. for sleep  Dispense: 30 tablet; Refill: 3    4. Chronic renal insufficiency, stage 3 (moderate)  -     Comprehensive Metabolic Panel    5. Hypothyroidism, unspecified type  -     TSH Rfx On  Abnormal To Free T4    6. Lumbar back pain  -     celecoxib (CeleBREX) 200 MG capsule; Take 1 capsule by mouth Daily.  Dispense: 30 capsule; Refill: 3    7. Xyphoidalgia  -     XR Sternum PA & Lateral; Future    Discussed and reviewed his diabetes and his significant weight loss that he has had utilizing the semaglutide and following his diet and exercise plan.  Will continue his medications at this time.  Will check the labs to be sure no adjustments are needed for his thyroid.  We also discussed his insomnia and continue the zolpidem but is to try and use as needed though he is only on 5 mg to continue to try to use as needed.  His back pain has been chronic and will change to Celebrex as a trial for back pain.  Avoid any ibuprofen or Naprosyn in the interim as well as his diclofenac is discontinued.  Upon evaluation he has had a significant weight loss and noted to have a large prominent xiphoid process area questionable tenderness but seems to be in a strange shape on exam.  Will obtain x-ray of the sternum for further evaluation.           Dictated utilizing Dragon Dictation

## 2024-06-26 LAB
ALBUMIN SERPL-MCNC: 4.1 G/DL (ref 3.5–5.2)
ALBUMIN/GLOB SERPL: 1.8 G/DL
ALP SERPL-CCNC: 146 U/L (ref 39–117)
ALT SERPL-CCNC: 36 U/L (ref 1–41)
AST SERPL-CCNC: 26 U/L (ref 1–40)
BILIRUB SERPL-MCNC: 0.7 MG/DL (ref 0–1.2)
BUN SERPL-MCNC: 24 MG/DL (ref 6–20)
BUN/CREAT SERPL: 19.5 (ref 7–25)
CALCIUM SERPL-MCNC: 9.1 MG/DL (ref 8.6–10.5)
CHLORIDE SERPL-SCNC: 105 MMOL/L (ref 98–107)
CHOLEST SERPL-MCNC: 127 MG/DL (ref 0–200)
CO2 SERPL-SCNC: 27.1 MMOL/L (ref 22–29)
CREAT SERPL-MCNC: 1.23 MG/DL (ref 0.76–1.27)
EGFRCR SERPLBLD CKD-EPI 2021: 67.6 ML/MIN/1.73
GLOBULIN SER CALC-MCNC: 2.3 GM/DL
GLUCOSE SERPL-MCNC: 76 MG/DL (ref 65–99)
HBA1C MFR BLD: 5.5 % (ref 4.8–5.6)
HDLC SERPL-MCNC: 31 MG/DL (ref 40–60)
LDLC SERPL CALC-MCNC: 76 MG/DL (ref 0–100)
POTASSIUM SERPL-SCNC: 4.8 MMOL/L (ref 3.5–5.2)
PROT SERPL-MCNC: 6.4 G/DL (ref 6–8.5)
SODIUM SERPL-SCNC: 139 MMOL/L (ref 136–145)
TRIGL SERPL-MCNC: 104 MG/DL (ref 0–150)
TSH SERPL DL<=0.005 MIU/L-ACNC: 2.06 UIU/ML (ref 0.27–4.2)
VLDLC SERPL CALC-MCNC: 20 MG/DL (ref 5–40)

## 2024-06-27 DIAGNOSIS — R07.89 XYPHOIDALGIA: Primary | ICD-10-CM

## 2024-07-02 DIAGNOSIS — I10 ESSENTIAL HYPERTENSION: ICD-10-CM

## 2024-07-02 RX ORDER — LOSARTAN POTASSIUM 100 MG/1
100 TABLET ORAL DAILY
Qty: 90 TABLET | Refills: 3 | Status: SHIPPED | OUTPATIENT
Start: 2024-07-02

## 2024-07-12 ENCOUNTER — HOSPITAL ENCOUNTER (OUTPATIENT)
Dept: CT IMAGING | Facility: HOSPITAL | Age: 59
Discharge: HOME | End: 2024-07-12
Admitting: FAMILY MEDICINE
Payer: COMMERCIAL

## 2024-07-12 DIAGNOSIS — R07.89 XYPHOIDALGIA: ICD-10-CM

## 2024-07-12 PROCEDURE — 25510000001 IOPAMIDOL 61 % SOLUTION: Performed by: FAMILY MEDICINE

## 2024-07-12 PROCEDURE — 71260 CT THORAX DX C+: CPT

## 2024-07-12 RX ADMIN — IOPAMIDOL 75 ML: 612 INJECTION, SOLUTION INTRAVENOUS at 08:20

## 2024-07-16 ENCOUNTER — PRIOR AUTHORIZATION (OUTPATIENT)
Dept: FAMILY MEDICINE CLINIC | Facility: CLINIC | Age: 59
End: 2024-07-16
Payer: COMMERCIAL

## 2024-07-16 ENCOUNTER — TELEPHONE (OUTPATIENT)
Dept: FAMILY MEDICINE CLINIC | Facility: CLINIC | Age: 59
End: 2024-07-16
Payer: COMMERCIAL

## 2024-07-16 NOTE — TELEPHONE ENCOUNTER
A PA has been done for the patients Ozempic and I am awaiting a decision.    KEY: [id number]    Approved 7/16/2024 to 7/16/2025

## 2024-07-16 NOTE — TELEPHONE ENCOUNTER
I called and spoke with the patient and let him know that Dr Lester said the CT of the chest demonstrated the xiphoid process which is a triangular bone at the bottom of the sternum is prominent and curving forward with some evidence of inflammation at the tip.  I let him know they are recommending follow-up CT in 6 months and in the interim apply ice/cold compresses to the area if it is uncomfortable and continue with the anti-inflammatory Celebrex daily as needed. He verbalized understanding and will call back if needed.

## 2024-07-21 DIAGNOSIS — M54.50 LUMBAR BACK PAIN: ICD-10-CM

## 2024-07-22 RX ORDER — CELECOXIB 200 MG/1
200 CAPSULE ORAL DAILY
Qty: 30 CAPSULE | Refills: 3 | OUTPATIENT
Start: 2024-07-22

## 2024-07-25 DIAGNOSIS — E03.9 HYPOTHYROIDISM, UNSPECIFIED TYPE: ICD-10-CM

## 2024-07-25 RX ORDER — LEVOTHYROXINE SODIUM 0.1 MG/1
TABLET ORAL
Qty: 90 TABLET | Refills: 0 | Status: SHIPPED | OUTPATIENT
Start: 2024-07-25

## 2024-08-19 DIAGNOSIS — I10 ESSENTIAL HYPERTENSION: ICD-10-CM

## 2024-08-19 RX ORDER — AMLODIPINE BESYLATE 10 MG/1
10 TABLET ORAL DAILY
Qty: 90 TABLET | Refills: 1 | Status: SHIPPED | OUTPATIENT
Start: 2024-08-19

## 2024-08-30 DIAGNOSIS — E11.65 TYPE 2 DIABETES MELLITUS WITH HYPERGLYCEMIA, WITHOUT LONG-TERM CURRENT USE OF INSULIN: ICD-10-CM

## 2024-08-30 DIAGNOSIS — E66.01 OBESITY, MORBID, BMI 50 OR HIGHER: ICD-10-CM

## 2024-08-30 RX ORDER — SEMAGLUTIDE 2.68 MG/ML
INJECTION, SOLUTION SUBCUTANEOUS
Qty: 3 ML | Refills: 5 | Status: SHIPPED | OUTPATIENT
Start: 2024-08-30

## 2024-09-19 ENCOUNTER — TELEPHONE (OUTPATIENT)
Dept: FAMILY MEDICINE CLINIC | Facility: CLINIC | Age: 59
End: 2024-09-19
Payer: COMMERCIAL

## 2024-10-19 DIAGNOSIS — E03.9 HYPOTHYROIDISM, UNSPECIFIED TYPE: ICD-10-CM

## 2024-10-19 DIAGNOSIS — M54.50 LUMBAR BACK PAIN: ICD-10-CM

## 2024-10-21 RX ORDER — CELECOXIB 200 MG/1
200 CAPSULE ORAL DAILY
Qty: 30 CAPSULE | Refills: 3 | Status: SHIPPED | OUTPATIENT
Start: 2024-10-21

## 2024-10-21 RX ORDER — LEVOTHYROXINE SODIUM 100 UG/1
TABLET ORAL
Qty: 90 TABLET | Refills: 1 | Status: SHIPPED | OUTPATIENT
Start: 2024-10-21

## 2024-11-09 DIAGNOSIS — F51.01 PRIMARY INSOMNIA: ICD-10-CM

## 2024-11-11 NOTE — TELEPHONE ENCOUNTER
LOV                   6/25/2024  NOV                   1/20/2025  Last refill             6/25/24  Protocol              not met    Please advise

## 2024-11-12 NOTE — TELEPHONE ENCOUNTER
PATIENT CALLED BACK TO ADVISE HE HAS AN APPOINTMENT SCHEDULED ON 1/20/25. HE STATES HIS PARENT(S) PASSED AWAY AND HE DOESN'T HAVE TIME OFF AT WORK AS HE WAS OFF EXTENDED TIME FOR THAT. IS THIS APPOINTMENT NOT SUFFICIENT FOR MED REFILL TILL THEN. PLEASE CALL PATIENT BACK TO DISCUSS.

## 2024-11-12 NOTE — TELEPHONE ENCOUNTER
OK for hub to relay    I called patient to schedule an appointment as he is due for a medication check. LMTCB, I will try again later

## 2024-11-13 RX ORDER — ZOLPIDEM TARTRATE 5 MG/1
5 TABLET ORAL NIGHTLY PRN
Qty: 14 TABLET | Refills: 0 | Status: SHIPPED | OUTPATIENT
Start: 2024-11-13

## 2024-12-03 DIAGNOSIS — F51.01 PRIMARY INSOMNIA: ICD-10-CM

## 2024-12-05 DIAGNOSIS — E29.1 HYPOGONADISM MALE: ICD-10-CM

## 2024-12-05 DIAGNOSIS — F51.01 PRIMARY INSOMNIA: ICD-10-CM

## 2024-12-05 RX ORDER — ZOLPIDEM TARTRATE 5 MG/1
5 TABLET ORAL NIGHTLY PRN
Qty: 14 TABLET | OUTPATIENT
Start: 2024-12-05

## 2024-12-06 RX ORDER — TESTOSTERONE CYPIONATE 200 MG/ML
200 INJECTION, SOLUTION INTRAMUSCULAR
Qty: 10 ML | Refills: 1 | OUTPATIENT
Start: 2024-12-06

## 2024-12-06 RX ORDER — ZOLPIDEM TARTRATE 5 MG/1
5 TABLET ORAL NIGHTLY PRN
Qty: 14 TABLET | Refills: 0 | OUTPATIENT
Start: 2024-12-06

## 2024-12-16 DIAGNOSIS — I10 ESSENTIAL HYPERTENSION: ICD-10-CM

## 2024-12-16 RX ORDER — METOPROLOL SUCCINATE 200 MG/1
200 TABLET, EXTENDED RELEASE ORAL DAILY
Qty: 90 TABLET | Refills: 1 | Status: SHIPPED | OUTPATIENT
Start: 2024-12-16

## 2025-01-20 ENCOUNTER — OFFICE VISIT (OUTPATIENT)
Dept: FAMILY MEDICINE CLINIC | Facility: CLINIC | Age: 60
End: 2025-01-20
Payer: COMMERCIAL

## 2025-01-20 VITALS
WEIGHT: 268.8 LBS | DIASTOLIC BLOOD PRESSURE: 84 MMHG | OXYGEN SATURATION: 97 % | TEMPERATURE: 97.3 F | SYSTOLIC BLOOD PRESSURE: 126 MMHG | BODY MASS INDEX: 38.48 KG/M2 | HEART RATE: 61 BPM | HEIGHT: 70 IN

## 2025-01-20 DIAGNOSIS — Z12.11 SCREENING FOR COLON CANCER: ICD-10-CM

## 2025-01-20 DIAGNOSIS — I10 PRIMARY HYPERTENSION: ICD-10-CM

## 2025-01-20 DIAGNOSIS — E11.65 TYPE 2 DIABETES MELLITUS WITH HYPERGLYCEMIA, WITHOUT LONG-TERM CURRENT USE OF INSULIN: Primary | ICD-10-CM

## 2025-01-20 DIAGNOSIS — Z23 IMMUNIZATION DUE: ICD-10-CM

## 2025-01-20 DIAGNOSIS — Z79.899 MEDICATION MANAGEMENT: ICD-10-CM

## 2025-01-20 DIAGNOSIS — Z28.21 COVID-19 VACCINE DOSE DECLINED: ICD-10-CM

## 2025-01-20 DIAGNOSIS — E29.1 HYPOGONADISM MALE: ICD-10-CM

## 2025-01-20 DIAGNOSIS — E03.9 HYPOTHYROIDISM, UNSPECIFIED TYPE: ICD-10-CM

## 2025-01-20 DIAGNOSIS — M54.50 LUMBAR BACK PAIN: ICD-10-CM

## 2025-01-20 DIAGNOSIS — F51.01 PRIMARY INSOMNIA: ICD-10-CM

## 2025-01-20 PROBLEM — R07.89 XYPHOIDALGIA: Status: RESOLVED | Noted: 2024-06-27 | Resolved: 2025-01-20

## 2025-01-20 PROCEDURE — 99214 OFFICE O/P EST MOD 30 MIN: CPT | Performed by: INTERNAL MEDICINE

## 2025-01-20 PROCEDURE — 90471 IMMUNIZATION ADMIN: CPT | Performed by: INTERNAL MEDICINE

## 2025-01-20 PROCEDURE — 90656 IIV3 VACC NO PRSV 0.5 ML IM: CPT | Performed by: INTERNAL MEDICINE

## 2025-01-20 RX ORDER — TESTOSTERONE CYPIONATE 200 MG/ML
200 INJECTION, SOLUTION INTRAMUSCULAR
Qty: 10 ML | Refills: 1 | Status: SHIPPED | OUTPATIENT
Start: 2025-01-20

## 2025-01-20 NOTE — PROGRESS NOTES
Subjective   Ryley Heller is a 59 y.o. male.     Chief Complaint   Patient presents with    Diabetes     Follow up    Hypertension     Follow up    Back Pain     He is having back pain that continues to get worse    Insomnia     He is having problems sleeping and said his sleeping pill is not doing anything anymore       History of Present Illness   History of diabetes.  Was started on metformin 5 mg but then patient discontinued.  Is actually utilizing the Ozempic 1 mg weekly.  No side effects of the medication and tolerating.  Was started on metformin 5 mg but then patient discontinued.  Is actually utilizing the Ozempic 2 mg weekly.  No side effects of the medication and tolerating.  Last A1c 6/25/2024 of 5.5% and 2/19/24 of 5.7%.  Here for follow-up today.  Has lost weight of 88 pounds in the last year.  Is following diet and exercise.     Follow-up for her hypertension.  Currently, they have been feeling well and asymptomatic without any headaches, cough, chest pain, shortness of breath, swelling, focal neurologic deficit , memory loss or syncope.  Has been taking the medications regularly and adherent with the regimen of amlodipine 10 mg, losartan 100 mg, metoprolol succinate  mg and triamterene-HCTC 37.5-25 daily.  Denies medication side effects and no significant interval events.       Follow-up for thyroid.  Denies fatigue, weakness, constipation/diarrhea, hair/skin changes, weight gain/loss, depression/anxiety, rashes, palpitations, swelling, chest pain, shortness of breath or other issues.  Has been compliant with taking the medication of levothyroxine 100 mcg daily with no recent changes.  Denies any difficulty with the current medication of levothyroxine 100 mcg daily.  Last labs performed on 6/25/24.     Follow-up for the testosterone.  Doing 1 ml every 3 weeks.  Last injection about 10 days ago.  No difficulty with mood, anger, urinating or with BM.  Last testosterone level and PSA 2/19/24  that was very low.     Has been having low back pain which is different from his normal pain.  Has tried to use ibuprofen, diclofenac without any significant relief like he used to have in the past.  Celecoxib helps for 3-4 hours and then no relief.  Pain can at times be severe and cannot stand straight up.  Weight loss has not seemed to help.  Here for follow-up today.  Has lost weight utilizing the semaglutide now at 2 mg equally.  Is following diet and exercise.     History of insomnia and using the ambien as needed usually 2-3 times per week.  Last filled 11/14/24(#14).  Has tried amitriptyline 50 mg, doxepin 10 mg and melatonin with no relief but no side effects.  Belsomra not covered by insurance and to expensive.  Denies anxiety, panic attacks or other issues.      The following portions of the patient's history were reviewed and updated as appropriate: allergies, current medications, past family history, past medical history, past social history, past surgical history and problem list.    Depression Screen:      2/19/2024    10:50 AM   PHQ-2/PHQ-9 Depression Screening   Retired Little Interest or Pleasure in Doing Things 0-->not at all   Retired Feeling Down, Depressed or Hopeless 0-->not at all   Retired PHQ-9: Brief Depression Severity Measure Score 0       Past Medical History:   Diagnosis Date    Allergic forever    flonase and over the counter    Allergic rhinitis     Anemia     Arthritis 2000    Stems from back injury at work and surgery    BMI 50.0-59.9, adult     Elevated alkaline phosphatase level     Encounter for immunization     Fatigue     Fever     Gastritis     GERD (gastroesophageal reflux disease)     Hypertension     Hypogonadism male     Hypothyroidism     Low back pain     Morbid obesity     Pneumonia     Visit for annual health examination     Visual impairment     Wear glasses or contacts       Past Surgical History:   Procedure Laterality Date    BACK SURGERY  08/2009    BARIATRIC  "SURGERY  1990s    COLONOSCOPY  10/2014    diverticulosis, internal hemorrhoids and polyps repeat in 3 years    EYE SURGERY  Lasik 12/27/24    LAMINECTOMY  2011    Decompress, Facetectomy, Foraminotomy Lumbar Seg  L4-S1     LASIK Bilateral 12/20/2024    SHOULDER SURGERY  10/2012    SPINE SURGERY  2011?    TONSILLECTOMY  1970       Family History   Adopted: Yes       Social History     Socioeconomic History    Marital status: Single   Tobacco Use    Smoking status: Never    Smokeless tobacco: Never    Tobacco comments:     never used   Vaping Use    Vaping status: Never Used   Substance and Sexual Activity    Alcohol use: Never    Drug use: Never    Sexual activity: Not Currently     Partners: Female     Birth control/protection: Abstinence       Current Outpatient Medications   Medication Sig Dispense Refill    amLODIPine (NORVASC) 10 MG tablet Take 1 tablet by mouth Daily. 90 tablet 1    celecoxib (CeleBREX) 200 MG capsule TAKE 1 CAPSULE BY MOUTH DAILY 30 capsule 3    fluticasone (FLONASE) 50 MCG/ACT nasal spray 2 sprays by Each Nare route Daily. 48 mL 2    levothyroxine (SYNTHROID, LEVOTHROID) 100 MCG tablet TAKE 1 TABLET BY MOUTH DAILY 90 tablet 1    losartan (COZAAR) 100 MG tablet Take 1 tablet by mouth Daily. 90 tablet 3    metoprolol succinate XL (TOPROL-XL) 200 MG 24 hr tablet Take 1 tablet by mouth Daily. 90 tablet 1    Semaglutide, 2 MG/DOSE, (Ozempic, 2 MG/DOSE,) 8 MG/3ML solution pen-injector DIAL AND INJECT UNDER THE SKIN 2 MG WEEKLY 3 mL 5    Syringe 20G X 1-1/2\" 3 ML misc Use as directed with testosterone injection every 1 to 4 weeks as ordered. 50 each 0    Testosterone Cypionate (DEPOTESTOTERONE CYPIONATE) 200 MG/ML injection Inject 1 mL into the appropriate muscle as directed by prescriber Every 21 (Twenty-One) Days. 10 mL 1    zolpidem (AMBIEN) 5 MG tablet Take 1 tablet by mouth At Night As Needed for Sleep. for sleep 14 tablet 0    amitriptyline (ELAVIL) 25 MG tablet Take 1 tablet by mouth Every " "Night. 30 tablet 4     No current facility-administered medications for this visit.       Review of Systems   Constitutional:  Negative for activity change, appetite change, chills, diaphoresis, fatigue, fever, unexpected weight gain and unexpected weight loss.   HENT:  Negative for congestion, nosebleeds, rhinorrhea, sore throat, swollen glands, trouble swallowing and voice change.    Eyes:  Negative for visual disturbance.   Respiratory:  Negative for cough, chest tightness, shortness of breath and wheezing.    Cardiovascular:  Negative for chest pain, palpitations and leg swelling.   Gastrointestinal:  Negative for abdominal pain, blood in stool, constipation, diarrhea, nausea, vomiting, GERD and indigestion.   Genitourinary:  Negative for dysuria, frequency and hematuria.   Musculoskeletal:  Positive for back pain and myalgias. Negative for arthralgias and neck pain.   Skin:  Negative for rash and wound.   Neurological:  Negative for dizziness, tremors, weakness, light-headedness, numbness, headache and memory problem.   Hematological:  Negative for adenopathy. Does not bruise/bleed easily.   Psychiatric/Behavioral:  Negative for sleep disturbance and depressed mood. The patient is not nervous/anxious.      Objective   /84 (BP Location: Left arm, Patient Position: Sitting, Cuff Size: Large Adult)   Pulse 61   Temp 97.3 °F (36.3 °C) (Temporal)   Ht 177.8 cm (70\")   Wt 122 kg (268 lb 12.8 oz)   SpO2 97%   BMI 38.57 kg/m²     Physical Exam  Vitals and nursing note reviewed.   Constitutional:       General: He is not in acute distress.     Appearance: He is well-developed. He is obese. He is not diaphoretic.   HENT:      Head: Normocephalic and atraumatic.      Right Ear: External ear normal.      Left Ear: External ear normal.      Nose: Nose normal.   Eyes:      Conjunctiva/sclera: Conjunctivae normal.      Pupils: Pupils are equal, round, and reactive to light.   Neck:      Thyroid: No thyromegaly.    "   Trachea: No tracheal deviation.   Cardiovascular:      Rate and Rhythm: Normal rate and regular rhythm.      Heart sounds: Normal heart sounds. No murmur heard.     No friction rub. No gallop.   Pulmonary:      Effort: Pulmonary effort is normal. No respiratory distress.      Breath sounds: Normal breath sounds.   Abdominal:      General: Bowel sounds are normal.      Palpations: Abdomen is soft. There is no mass.      Tenderness: There is no abdominal tenderness. There is no guarding.   Musculoskeletal:         General: Normal range of motion.      Cervical back: Normal range of motion and neck supple.   Lymphadenopathy:      Cervical: No cervical adenopathy.   Skin:     General: Skin is warm and dry.      Capillary Refill: Capillary refill takes less than 2 seconds.      Findings: No rash.   Neurological:      Mental Status: He is alert and oriented to person, place, and time.      Motor: No abnormal muscle tone.      Deep Tendon Reflexes: Reflexes normal.   Psychiatric:         Behavior: Behavior normal.         Thought Content: Thought content normal.         Judgment: Judgment normal.     No results found for this or any previous visit (from the past 12 weeks).  Assessment & Plan   Diagnoses and all orders for this visit:    1. Type 2 diabetes mellitus with hyperglycemia, without long-term current use of insulin (Primary)  -     Comprehensive Metabolic Panel  -     Hemoglobin A1c  -     Lipid Panel  -     Microalbumin / Creatinine Urine Ratio - Urine, Clean Catch    2. Primary hypertension  -     Comprehensive Metabolic Panel  -     Lipid Panel    3. Hypothyroidism, unspecified type  -     TSH Rfx On Abnormal To Free T4    4. Lumbar back pain    5. Hypogonadism male  -     Testosterone Cypionate (DEPOTESTOTERONE CYPIONATE) 200 MG/ML injection; Inject 1 mL into the appropriate muscle as directed by prescriber Every 21 (Twenty-One) Days.  Dispense: 10 mL; Refill: 1    6. Primary insomnia  -     amitriptyline  (ELAVIL) 25 MG tablet; Take 1 tablet by mouth Every Night.  Dispense: 30 tablet; Refill: 4    7. Medication management  -     ToxASSURE Select 13 (MW) - Urine, Clean Catch    8. Screening for colon cancer  -     Ambulatory Referral For Screening Colonoscopy    9. Immunization due  -     Fluzone >6mos ([id number])    10. COVID-19 vaccine dose declined    Diabetes and hypertension along with his thyroid had been previously controlled.  Check labs as noted above and adjust based upon the results.  History of hypogonadism continue the testosterone every 21 days.  Is not due for his PSA or testosterone levels for another month.  We discussed his insomnia for which the zolpidem is not working and doxepin did not help in the past.  Will try the amitriptyline as noted above with good sleep hygiene.  Tox assure today.  Referred for his follow-up screening colonoscopy.           Dictated utilizing Dragon Dictation

## 2025-01-21 ENCOUNTER — PATIENT MESSAGE (OUTPATIENT)
Dept: FAMILY MEDICINE CLINIC | Facility: CLINIC | Age: 60
End: 2025-01-21
Payer: COMMERCIAL

## 2025-01-23 DIAGNOSIS — M54.50 LUMBAR BACK PAIN: Primary | ICD-10-CM

## 2025-01-27 ENCOUNTER — HOSPITAL ENCOUNTER (OUTPATIENT)
Dept: GENERAL RADIOLOGY | Facility: HOSPITAL | Age: 60
Discharge: HOME | End: 2025-01-27
Admitting: INTERNAL MEDICINE
Payer: COMMERCIAL

## 2025-01-27 DIAGNOSIS — M54.50 LUMBAR BACK PAIN: ICD-10-CM

## 2025-01-27 PROCEDURE — 72100 X-RAY EXAM L-S SPINE 2/3 VWS: CPT

## 2025-01-28 DIAGNOSIS — M54.50 LUMBAR BACK PAIN: Primary | ICD-10-CM

## 2025-01-30 LAB
ALBUMIN SERPL-MCNC: 4.2 G/DL (ref 3.8–4.9)
ALBUMIN/CREAT UR: 2 MG/G CREAT (ref 0–29)
ALP SERPL-CCNC: 137 IU/L (ref 44–121)
ALT SERPL-CCNC: 24 IU/L (ref 0–44)
AST SERPL-CCNC: 23 IU/L (ref 0–40)
BILIRUB SERPL-MCNC: 0.5 MG/DL (ref 0–1.2)
BUN SERPL-MCNC: 30 MG/DL (ref 6–24)
BUN/CREAT SERPL: 26 (ref 9–20)
CALCIUM SERPL-MCNC: 9.3 MG/DL (ref 8.7–10.2)
CHLORIDE SERPL-SCNC: 103 MMOL/L (ref 96–106)
CHOLEST SERPL-MCNC: 112 MG/DL (ref 100–199)
CO2 SERPL-SCNC: 23 MMOL/L (ref 20–29)
CREAT SERPL-MCNC: 1.16 MG/DL (ref 0.76–1.27)
CREAT UR-MCNC: 281.8 MG/DL
DRUGS UR: NORMAL
EGFRCR SERPLBLD CKD-EPI 2021: 73 ML/MIN/1.73
GLOBULIN SER CALC-MCNC: 1.9 G/DL (ref 1.5–4.5)
GLUCOSE SERPL-MCNC: 77 MG/DL (ref 70–99)
HBA1C MFR BLD: 5.8 % (ref 4.8–5.6)
HDLC SERPL-MCNC: 35 MG/DL
LDLC SERPL CALC-MCNC: 59 MG/DL (ref 0–99)
MICROALBUMIN UR-MCNC: 5.4 UG/ML
POTASSIUM SERPL-SCNC: 4.4 MMOL/L (ref 3.5–5.2)
PROT SERPL-MCNC: 6.1 G/DL (ref 6–8.5)
SODIUM SERPL-SCNC: 143 MMOL/L (ref 134–144)
TRIGL SERPL-MCNC: 92 MG/DL (ref 0–149)
TSH SERPL DL<=0.005 MIU/L-ACNC: 1.67 UIU/ML (ref 0.45–4.5)
VLDLC SERPL CALC-MCNC: 18 MG/DL (ref 5–40)

## 2025-02-03 ENCOUNTER — TELEPHONE (OUTPATIENT)
Dept: FAMILY MEDICINE CLINIC | Facility: CLINIC | Age: 60
End: 2025-02-03
Payer: COMMERCIAL

## 2025-02-03 DIAGNOSIS — M54.50 LUMBAR BACK PAIN: Primary | ICD-10-CM

## 2025-02-03 NOTE — TELEPHONE ENCOUNTER
[facility] financial office called.  The  prior authorization for the CT that Dr Lester ordered has been denied.  If  he wants to do a peer to peer, please call [phone] with in 10 days of the denial date, which was 02/01.  The tracking number for it is [id number].  The paperwork that was sent over has already been scanned into the  by the lady from the financial office.

## 2025-02-10 DIAGNOSIS — I10 ESSENTIAL HYPERTENSION: ICD-10-CM

## 2025-02-10 DIAGNOSIS — E66.01 OBESITY, MORBID, BMI 50 OR HIGHER: ICD-10-CM

## 2025-02-10 DIAGNOSIS — E11.65 TYPE 2 DIABETES MELLITUS WITH HYPERGLYCEMIA, WITHOUT LONG-TERM CURRENT USE OF INSULIN: ICD-10-CM

## 2025-02-10 RX ORDER — AMLODIPINE BESYLATE 10 MG/1
10 TABLET ORAL DAILY
Qty: 90 TABLET | Refills: 1 | Status: SHIPPED | OUTPATIENT
Start: 2025-02-10

## 2025-02-10 RX ORDER — SEMAGLUTIDE 2.68 MG/ML
INJECTION, SOLUTION SUBCUTANEOUS
Qty: 3 ML | Refills: 5 | Status: SHIPPED | OUTPATIENT
Start: 2025-02-10

## 2025-02-12 DIAGNOSIS — M54.50 LUMBAR BACK PAIN: ICD-10-CM

## 2025-02-12 RX ORDER — CELECOXIB 200 MG/1
200 CAPSULE ORAL DAILY
Qty: 30 CAPSULE | Refills: 3 | Status: SHIPPED | OUTPATIENT
Start: 2025-02-12

## 2025-02-16 DIAGNOSIS — F51.01 PRIMARY INSOMNIA: ICD-10-CM

## 2025-02-17 NOTE — TELEPHONE ENCOUNTER
LOV                   1/20/2025  NOV                  (around 7/20/2025   Last refill           1/20/25  Protocol             met

## 2025-02-20 ENCOUNTER — TELEPHONE (OUTPATIENT)
Dept: FAMILY MEDICINE CLINIC | Facility: CLINIC | Age: 60
End: 2025-02-20
Payer: COMMERCIAL

## 2025-02-20 NOTE — TELEPHONE ENCOUNTER
Dorys from [facility] called regarding the MRI lumbar that is scheduled for tomorrow.   The authorization is still pending but she says they do need a Peer to Peer.  If it isn't approved by 2:30 today, she is wanting to know what Dr Lester recommends?  Does the patient still need to have it tomorrow or can it wait if a peer to peer isn't done and it doesn't get approved?  If he wants to try to do a peer to peer all of that information (phone number and such) is in the comments of the referral.  Please let Dorys know, her phone number is   [phone].

## 2025-02-21 ENCOUNTER — TELEPHONE (OUTPATIENT)
Dept: FAMILY MEDICINE CLINIC | Facility: CLINIC | Age: 60
End: 2025-02-21

## 2025-02-24 ENCOUNTER — TELEPHONE (OUTPATIENT)
Dept: FAMILY MEDICINE CLINIC | Facility: CLINIC | Age: 60
End: 2025-02-24
Payer: COMMERCIAL

## 2025-02-24 NOTE — TELEPHONE ENCOUNTER
Receive a call from Millicent Matthews requesting that the follow patient's MRI order is faxed to [facility].  Fax#: [phone]    The auth number for the MRI   [id number] (Date range 2/18/2025 - 3/19/2025) tahir matthews 2/24/2025 @ 12:11 pm.

## 2025-02-27 ENCOUNTER — TELEPHONE (OUTPATIENT)
Dept: GASTROENTEROLOGY | Facility: CLINIC | Age: 60
End: 2025-02-27
Payer: COMMERCIAL

## 2025-02-27 DIAGNOSIS — Z12.11 ENCOUNTER FOR SCREENING FOR MALIGNANT NEOPLASM OF COLON: Primary | ICD-10-CM

## 2025-02-27 RX ORDER — SODIUM CHLORIDE, SODIUM LACTATE, POTASSIUM CHLORIDE, CALCIUM CHLORIDE 600; 310; 30; 20 MG/100ML; MG/100ML; MG/100ML; MG/100ML
30 INJECTION, SOLUTION INTRAVENOUS CONTINUOUS
OUTPATIENT
Start: 2025-02-27 | End: 2025-02-27

## 2025-02-27 NOTE — TELEPHONE ENCOUNTER
Last colonoscopy over 12 yrs ago with lena - no records    No personal hx polyps     No family hx polyps or cx - patient is adopted and doesn't know of any family hx    ASA or blood thinners:  Aspirin  Celebrex    List medications:  Guaifenesin  Amitriptyline   Levothyroxine  Berberine  Zinc  Magnesium  Metoprolol  Amlodipine  Losartan  Zyrtec  B12    OA form scanned in media

## 2025-02-28 ENCOUNTER — TELEPHONE (OUTPATIENT)
Dept: GASTROENTEROLOGY | Facility: CLINIC | Age: 60
End: 2025-02-28
Payer: COMMERCIAL

## 2025-03-03 ENCOUNTER — TELEPHONE (OUTPATIENT)
Dept: GASTROENTEROLOGY | Facility: CLINIC | Age: 60
End: 2025-03-03

## 2025-03-03 NOTE — TELEPHONE ENCOUNTER
Pt called returning Cat's call.  He would like a call back after he gets off work.  Anytime after 3:30.

## 2025-03-04 DIAGNOSIS — M54.50 LUMBAR BACK PAIN: ICD-10-CM

## 2025-03-04 NOTE — TELEPHONE ENCOUNTER
"  Hub staff attempted to follow warm transfer process and was unsuccessful     Caller: Ryley Heller \"Jadon\"    Relationship to patient: Self    Best call back number: [phone]    Patient is needing: PATIENT NEEDING TO SCHEDULE SCOPE. PATIENT HAD REQUESTED AFTER 330 AS NOTED.  PLEASE REACH OUT TO SCHEDULE. THANK YOU  "

## 2025-03-05 NOTE — TELEPHONE ENCOUNTER
"        Hub staff attempted to follow warm transfer process and was unsuccessful     Caller: Ryley Heller \"Jadon\"    Relationship to patient: Self    Best call back number: [phone]     Patient is needing: PATIENT IS CALLING TO SCHEDULE SCOPE.  PLEASE CALL BACK.  THE BEST TIME TO REACH THE PATIENT IS AFTER 3:30 PM.        "

## 2025-03-06 DIAGNOSIS — M48.00 CENTRAL STENOSIS OF SPINAL CANAL: ICD-10-CM

## 2025-03-06 DIAGNOSIS — M54.50 LUMBAR BACK PAIN: Primary | ICD-10-CM

## 2025-03-06 DIAGNOSIS — M51.362 DEGENERATION OF INTERVERTEBRAL DISC OF LUMBAR REGION WITH DISCOGENIC BACK PAIN AND LOWER EXTREMITY PAIN: ICD-10-CM

## 2025-03-31 ENCOUNTER — OFFICE VISIT (OUTPATIENT)
Dept: PAIN MEDICINE | Facility: CLINIC | Age: 60
End: 2025-03-31
Payer: COMMERCIAL

## 2025-03-31 VITALS
HEART RATE: 76 BPM | WEIGHT: 279 LBS | OXYGEN SATURATION: 96 % | SYSTOLIC BLOOD PRESSURE: 132 MMHG | TEMPERATURE: 96.3 F | DIASTOLIC BLOOD PRESSURE: 72 MMHG | HEIGHT: 70 IN | BODY MASS INDEX: 39.94 KG/M2 | RESPIRATION RATE: 20 BRPM

## 2025-03-31 DIAGNOSIS — M47.816 LUMBAR FACET ARTHROPATHY: ICD-10-CM

## 2025-03-31 DIAGNOSIS — M62.838 MUSCLE SPASM: ICD-10-CM

## 2025-03-31 DIAGNOSIS — M48.061 SPINAL STENOSIS OF LUMBAR REGION WITHOUT NEUROGENIC CLAUDICATION: Primary | ICD-10-CM

## 2025-03-31 PROCEDURE — 99214 OFFICE O/P EST MOD 30 MIN: CPT | Performed by: NURSE PRACTITIONER

## 2025-03-31 RX ORDER — SENNOSIDES 8.6 MG
650 CAPSULE ORAL EVERY 8 HOURS PRN
COMMUNITY

## 2025-03-31 RX ORDER — CYCLOBENZAPRINE HCL 5 MG
5 TABLET ORAL 3 TIMES DAILY PRN
Qty: 90 TABLET | Refills: 1 | Status: SHIPPED | OUTPATIENT
Start: 2025-03-31

## 2025-03-31 NOTE — PROGRESS NOTES
CHIEF COMPLAINT  Back pain  Takes celebrex 200 mg daily,tylenol 650 mg bid.    Subjective   Ryley Heller is a 59 y.o. male.   He presents to the office for evaluation of back pain. He was referred here by Dr. Lester.    Mr. Heller's back pain started in 1999 with a work injury. He underwent surgery in 2011 with Dr. Bernard and states that his pain improved significantly. Over the last 3 years his pain has slowly increased with his pain significantly worsening over the last year or so.     Today his pain is 7/10VAS in severity. He describes his back pain as continuous throbbing pain that will radiate into his lateral hips with prolonged standing. He denies any pain radiating down his legs. His primary pain complaint is belt-line pain.  He denies any numbness, tingling, or heaviness in his lower extremities.  His pain is worsened by bending, twisting, standing, walking, and prolonged position (laying, sitting); it is improved by nothing in particular. Heat is helpful for short periods of time. TENS is helpful at times, but he states on bad days nothing seems to help.     He is a  at [employer].     Past pain medications: Ibuprofen     Current pain medications: Celebrex 200 mg daily     Past therapies:  Physical Therapy: Yes, years ago prior to surgery--no relief  Chiropractor: None  Massage Therapy: None  TENS: Yes, temporary relief  Neck or back surgery: Lumbar discectomy on 2011 performed by Dr. Bernard   Past pain management: Yes, unsure of the name of the office.      Previous Injections:   LESI x 2 and what sounds like a Lumbar TFESI x 1 (all performed prior to surgery, no relief). He does note that his back pain is different at this time compared to his pain prior to surgery.     Back Pain  This is a chronic problem. The current episode started more than 1 year ago. The problem occurs constantly. The problem has been worse since onset. The pain is present in the  "lumbar spine and sacro-iliac. The quality of the pain is described as aching (throbbing). The pain does not radiate. The pain is at a severity of 7/10. The pain is severe. The symptoms are aggravated by standing, bending, twisting, position, sitting and lying down (walking). Associated symptoms include weakness (left leg). Pertinent negatives include no abdominal pain, chest pain, fever, headaches or numbness. He has tried heat and NSAIDs (Tylenol, TENS) for the symptoms.      PEG Assessment   What number best describes your pain on average in the past week?8  What number best describes how, during the past week, pain has interfered with your enjoyment of life?9  What number best describes how, during the past week, pain has interfered with your general activity?  9    Current Outpatient Medications:     acetaminophen (TYLENOL) 650 MG 8 hr tablet, Take 1 tablet by mouth Every 8 (Eight) Hours As Needed for Mild Pain., Disp: , Rfl:     amitriptyline (ELAVIL) 25 MG tablet, Take 1 tablet by mouth Every Night., Disp: 30 tablet, Rfl: 4    amLODIPine (NORVASC) 10 MG tablet, TAKE 1 TABLET BY MOUTH DAILY, Disp: 90 tablet, Rfl: 1    celecoxib (CeleBREX) 200 MG capsule, TAKE 1 CAPSULE BY MOUTH DAILY, Disp: 30 capsule, Rfl: 3    levothyroxine (SYNTHROID, LEVOTHROID) 100 MCG tablet, TAKE 1 TABLET BY MOUTH DAILY, Disp: 90 tablet, Rfl: 1    losartan (COZAAR) 100 MG tablet, Take 1 tablet by mouth Daily., Disp: 90 tablet, Rfl: 3    metoprolol succinate XL (TOPROL-XL) 200 MG 24 hr tablet, Take 1 tablet by mouth Daily., Disp: 90 tablet, Rfl: 1    Ozempic, 2 MG/DOSE, 8 MG/3ML solution pen-injector, DIAL AND INJECT UNDER THE SKIN 2 MG WEEKLY, Disp: 3 mL, Rfl: 5    Syringe 20G X 1-1/2\" 3 ML misc, Use as directed with testosterone injection every 1 to 4 weeks as ordered., Disp: 50 each, Rfl: 0    Testosterone Cypionate (DEPOTESTOTERONE CYPIONATE) 200 MG/ML injection, Inject 1 mL into the appropriate muscle as directed by prescriber Every " 21 (Twenty-One) Days., Disp: 10 mL, Rfl: 1    zolpidem (AMBIEN) 5 MG tablet, Take 1 tablet by mouth At Night As Needed for Sleep. for sleep, Disp: 14 tablet, Rfl: 0    cyclobenzaprine (FLEXERIL) 5 MG tablet, Take 1 tablet by mouth 3 (Three) Times a Day As Needed for Muscle Spasms., Disp: 90 tablet, Rfl: 1    fluticasone (FLONASE) 50 MCG/ACT nasal spray, 2 sprays by Each Nare route Daily., Disp: 48 mL, Rfl: 2    Ibuprofen 3 %, Gabapentin 10 %, Baclofen 2 %, lidocaine 4 %, Ketamine HCl 4 %, Apply 1-2 g topically to the appropriate area as directed 3 (Three) to 4 (Four) times daily., Disp: 90 g, Rfl: 0    The following portions of the patient's history were reviewed and updated as appropriate: allergies, current medications, past family history, past medical history, past social history, past surgical history, and problem list.      REVIEW OF PERTINENT MEDICAL DATA            1/20/2025:  Hemoglobin A1c-5.8  Creatinine-1.16    Review of Systems   Constitutional:  Negative for activity change (less), fatigue and fever.   Respiratory:  Negative for cough and chest tightness.    Cardiovascular:  Negative for chest pain.   Gastrointestinal:  Negative for abdominal pain, constipation and diarrhea.   Musculoskeletal:  Positive for back pain.   Neurological:  Positive for weakness (left leg). Negative for dizziness, light-headedness, numbness and headaches.   Psychiatric/Behavioral:  Positive for sleep disturbance. Negative for agitation and suicidal ideas. The patient is not nervous/anxious.      --  The aforementioned information the Chief Complaint section and above subjective data including any HPI data, and also the Review of Systems data, has been personally reviewed and affirmed.  --    Vitals:    03/31/25 1326   BP: 132/72   BP Location: Left arm   Patient Position: Sitting   Cuff Size: Large Adult   Pulse: 76   Resp: 20   Temp: 96.3 °F (35.7 °C)   TempSrc: Skin   SpO2: 96%   Weight: 127 kg (279 lb)   Height: 177.8 cm  "(70\")   PainSc: 7      Objective   Physical Exam  Vitals and nursing note reviewed.   Constitutional:       Appearance: Normal appearance. He is well-developed.   Eyes:      General: Lids are normal.   Cardiovascular:      Rate and Rhythm: Normal rate.   Pulmonary:      Effort: Pulmonary effort is normal.   Musculoskeletal:      Lumbar back: Tenderness and bony tenderness present. Decreased range of motion.      Comments:   +Lumbar facet loading  Back pain with Bilateral SLR, no leg pain  +Bilateral Nita  +Mathieu SI Compression  - Mathieu SI Distraction  +Left Thigh Thrust, - Right  +Left Gaenslen's, - Right   Neurological:      Mental Status: He is alert and oriented to person, place, and time.      Motor: No weakness.      Deep Tendon Reflexes:      Reflex Scores:       Patellar reflexes are 0 on the right side and 0 on the left side.       Achilles reflexes are 0 on the right side and 0 on the left side.  Psychiatric:         Speech: Speech normal.         Behavior: Behavior normal.         Judgment: Judgment normal.       Assessment & Plan   Diagnoses and all orders for this visit:    1. Spinal stenosis of lumbar region without neurogenic claudication (Primary)  -     Ambulatory Referral to Physical Therapy for Evaluation & Treatment  -     Ibuprofen 3 %, Gabapentin 10 %, Baclofen 2 %, lidocaine 4 %, Ketamine HCl 4 %; Apply 1-2 g topically to the appropriate area as directed 3 (Three) to 4 (Four) times daily.  Dispense: 90 g; Refill: 0  -     cyclobenzaprine (FLEXERIL) 5 MG tablet; Take 1 tablet by mouth 3 (Three) Times a Day As Needed for Muscle Spasms.  Dispense: 90 tablet; Refill: 1    2. Lumbar facet arthropathy  -     Ambulatory Referral to Physical Therapy for Evaluation & Treatment  -     Ibuprofen 3 %, Gabapentin 10 %, Baclofen 2 %, lidocaine 4 %, Ketamine HCl 4 %; Apply 1-2 g topically to the appropriate area as directed 3 (Three) to 4 (Four) times daily.  Dispense: 90 g; Refill: 0  -     cyclobenzaprine " (FLEXERIL) 5 MG tablet; Take 1 tablet by mouth 3 (Three) Times a Day As Needed for Muscle Spasms.  Dispense: 90 tablet; Refill: 1    3. Muscle spasm  -     cyclobenzaprine (FLEXERIL) 5 MG tablet; Take 1 tablet by mouth 3 (Three) Times a Day As Needed for Muscle Spasms.  Dispense: 90 tablet; Refill: 1      --- Ryley Heller reports a pain score of 7.  Given his pain assessment as noted, treatment options were discussed and the following options were decided upon as a follow-up plan to address the patient's pain: referral to Physical Therapy and muscle relaxants, compounded pain cream .    --- Refer to PT for low back pain and lumbar arthropathy  --- Trial compounded pain cream and flexeril 5 mg TID PRN  --- Consider Lumbar MBB/RFA if pain remains unstable after completion of PT  --- Follow-up after completion of PT     SOTO REPORT    As the clinician, I personally reviewed the SOTO from 3/31/2025 while the patient was in the office today.    Dictated utilizing Dragon dictation.

## 2025-04-16 DIAGNOSIS — E03.9 HYPOTHYROIDISM, UNSPECIFIED TYPE: ICD-10-CM

## 2025-04-16 RX ORDER — LEVOTHYROXINE SODIUM 100 UG/1
100 TABLET ORAL DAILY
Qty: 90 TABLET | Refills: 1 | Status: SHIPPED | OUTPATIENT
Start: 2025-04-16

## 2025-05-02 ENCOUNTER — TREATMENT (OUTPATIENT)
Dept: PHYSICAL THERAPY | Facility: CLINIC | Age: 60
End: 2025-05-02
Payer: COMMERCIAL

## 2025-05-02 DIAGNOSIS — G89.29 CHRONIC BILATERAL LOW BACK PAIN WITHOUT SCIATICA: Primary | ICD-10-CM

## 2025-05-02 DIAGNOSIS — R29.898 BILATERAL LEG WEAKNESS: ICD-10-CM

## 2025-05-02 DIAGNOSIS — M54.50 CHRONIC BILATERAL LOW BACK PAIN WITHOUT SCIATICA: Primary | ICD-10-CM

## 2025-05-02 DIAGNOSIS — M47.816 LUMBAR FACET ARTHROPATHY: ICD-10-CM

## 2025-05-02 DIAGNOSIS — M48.061 SPINAL STENOSIS OF LUMBAR REGION WITHOUT NEUROGENIC CLAUDICATION: ICD-10-CM

## 2025-05-02 DIAGNOSIS — M53.86 DECREASED ROM OF LUMBAR SPINE: ICD-10-CM

## 2025-05-02 DIAGNOSIS — M62.838 MUSCLE SPASM: ICD-10-CM

## 2025-05-02 RX ORDER — CYCLOBENZAPRINE HCL 5 MG
5 TABLET ORAL 3 TIMES DAILY PRN
Qty: 90 TABLET | Refills: 1 | Status: SHIPPED | OUTPATIENT
Start: 2025-05-02

## 2025-05-02 NOTE — PROGRESS NOTES
Physical Therapy Initial Evaluation and Plan of Care    [address] [facility] [address]        Patient: Ryley Heller   : 1965  Diagnosis/ICD-10 Code:  Chronic bilateral low back pain without sciatica [M54.50, G89.29]  Referring practitioner: SHEBA Santos  Date of Initial Visit: 2025  Today's Date:  2025  Patient seen for 1 sessions           Subjective Questionnaire: Oswestry: 41/50      Subjective Evaluation    History of Present Illness  Mechanism of injury: Pt had injury at work ([employer]) in  and then did PT and injections with finally surgery in  consisting of discectomy and has been good since then until the past few months. Pt is a teacher now and teaches social studies; on feet a lot at school. Pain has gradually gotten worse over the past few months. Pt has lost over 100lbs on Ozempic over the past few years. Has tried to go the gym to lose weight but gym makes his pain worse. Pain radiates to B hips but nothing down legs. Denies numbness/tingling BLE. Has back brace, TENS, hot tub that doesn't help. Has had to take more and more ibuprofen over the past few months for pain control. Changing positions helps manage the pain. Has a lift chair at home and adjustable bed to help with position changes. L side seems to be slightly worse than R.     Hobbies: working out at the gym (treadmill), outdoors (hunting and fishing)    Xrays:   Only 4 nonrib-bearing lumbar-type vertebral bodies are present. Mild retrolisthesis of L2 on L3, L4 on S1 (only 4 lumbar-type vertebral bodies are present). Slight/borderline anterolisthesis of L3 on L4. Mild anterior wedging of lower thoracic vertebral bodies appears stable from   2024 chest CT exam. Multilevel endplate spurring, disc space narrowing, and facet arthropathy are present. If further imaging evaluation is indicated, MRI could be considered. Surgical changes are noted in the left aspect of the abdomen.      MRI:  Multilevel degenerative or arthritic changes in the lumbar spine. There is a severe narrowing in the central canal at the L3-L4 and L4-L5, there is also a narrowing where the nerves exit the spine at the L1-L2 all the way through the L5-S1 levels.  With these findings I would recommend at a minimum pain management would likely injection and if that fails then we may need to have surgery. MD wants to try PT before pursuing any other tx.         Patient Occupation:  Quality of life: excellent    Pain  Current pain ratin  At worst pain ratin  Location: low back  Quality: throbbing, dull ache, discomfort and tight (stabbing)  Relieving factors: change in position, heat, ice, medications and support (inconsistent)  Aggravating factors: lifting, movement, ambulation, squatting, repetitive movement, sleeping, stairs, standing and prolonged positioning  Progression: worsening    Social Support  Lives in: one-story house (but has steps at school)    Hand dominance: left    Diagnostic Tests  X-ray: abnormal  MRI studies: abnormal    Treatments  Previous treatment: physical therapy, injection treatment and medication (had injections prior to surgery in  but none recently)  Patient Goals  Patient goals for therapy: decreased pain, improved balance, increased motion, increased strength, independence with ADLs/IADLs and return to sport/leisure activities           Past Medical History:   Diagnosis Date    Allergic forever    flonase and over the counter    Allergic rhinitis     Anemia     Arthritis     Stems from back injury at work and surgery    BMI 50.0-59.9, adult     Chronic pain disorder     Injuried back at [employer], have had surgery.  Improved till about 3 years ago and has continued to worsen    Elevated alkaline phosphatase level     Encounter for immunization     Fatigue     Fever     Gastritis     GERD (gastroesophageal reflux disease)     Hypertension     Hypogonadism  male     Hypothyroidism     Low back pain 1999    See above notation    Lumbosacral disc disease 1999    Had surgery 2011    Morbid obesity     Pneumonia     Visit for annual health examination     Visual impairment     Wear glasses or contacts      Past Surgical History:   Procedure Laterality Date    BACK SURGERY  08/2009    BARIATRIC SURGERY  1990s    COLONOSCOPY  10/2014    diverticulosis, internal hemorrhoids and polyps repeat in 3 years    EPIDURAL BLOCK  2003?    Goran not help    EYE SURGERY  Lasik 12/27/24    LAMINECTOMY  2011    Decompress, Facetectomy, Foraminotomy Lumbar Seg  L4-S1     LASIK Bilateral 12/20/2024    ORTHOPEDIC SURGERY  2009    Improved for a while    SHOULDER SURGERY  10/2012    SPINE SURGERY  2011?    TONSILLECTOMY  1970          Objective          Palpation   Left   Hypertonic in the erector spinae, iliopsoas, lumbar interspinals, lumbar paraspinals and quadratus lumborum.   Tenderness of the erector spinae, lumbar interspinals, lumbar paraspinals and quadratus lumborum.     Right   Hypertonic in the erector spinae, iliopsoas, lumbar interspinals, lumbar paraspinals and quadratus lumborum. Tenderness of the erector spinae, lumbar interspinals, lumbar paraspinals and quadratus lumborum.     Additional Palpation Details  Pain is worse with palpation at lower levels vs upper     Tenderness     Lumbar Spine  Tenderness in the spinous process and facet joint.     Left Hip   Tenderness in the PSIS. No tenderness in the ASIS.     Right Hip   Tenderness in the PSIS. No tenderness in the ASIS.     Neurological Testing     Sensation     Lumbar   Left   Intact: light touch    Right   Intact: light touch    Reflexes   Left   Patellar (L4): trace (1+)  Achilles (S1): absent (0)    Right   Patellar (L4): trace (1+)  Achilles (S1): absent (0)    Additional Neurological Details  Pt denies numbness or tingling BLE    Active Range of Motion     Lumbar   Flexion: 45 (can reach to toes but sig increased  pain) degrees   Extension: 5 degrees with pain  Left lateral flexion: Active left lumbar lateral flexion: 50% with pain  Right lateral flexion: Active right lumbar lateral flexion: 50% with pain  Left rotation: Active left lumbar rotation: 25% with pain  Right rotation: Active right lumbar rotation: 25% with pain    Strength/Myotome Testing     Left Hip   Planes of Motion   Flexion: 2- (supine)  Abduction: 4 (hooklying)  Adduction: 4 (hooklying)    Right Hip   Planes of Motion   Flexion: 2 (supine)  Abduction: 4 (hooklying)  Adduction: 4 (hooklying)    Left Knee   Flexion: 3+  Extension: 4    Right Knee   Flexion: 3+  Extension: 4    Left Ankle/Foot   Dorsiflexion: 5  Great toe extension: 5    Right Ankle/Foot   Dorsiflexion: 5  Great toe extension: 5    Additional Strength Details  Active SLR:   LLE: 12 degrees  RLE: 28 degrees      Passive SLR  LLE: 22 degrees  RLE: 34 degrees  Back pain with full knee ext    Tests       Thoracic   Negative slump.     Lumbar   Positive repeated extension and repeated flexion.     Left   Positive passive SLR.     Right   Positive passive SLR.     Left Pelvic Girdle/Sacrum   Positive: sacrum compression.     Right Pelvic Girdle/Sacrum   Positive: sacrum compression.     Left Hip   Positive long sit.   90/90 SLR: Positive.     Right Hip   Positive long sit.   90/90 SLR: Positive.     Additional Tests Details  Unable to tolerate piriformis, GYPSY or scour testing positions due to back pain    Lumbar Flexibility Comments:   Moderate HS and ITBand tightness; limited testing due to pain    Ambulation     Observational Gait   Gait: antalgic   Decreased left stance time, right stance time, left swing time, right swing time, left step length and right step length.   Left foot contact pattern: foot flat  Right foot contact pattern: foot flat    Additional Observational Gait Details  Forward gaze  Favors LLE  Thoracic kyphosis    Functional Assessment     Comments  TUG: 15.2 sec; UE support  to stand from chair  5x STS: 25.8 sec          Assessment & Plan       Assessment  Impairments: abnormal gait, abnormal muscle firing, abnormal muscle tone, abnormal or restricted ROM, activity intolerance, impaired balance, impaired physical strength, lacks appropriate home exercise program, pain with function, safety issue and weight-bearing intolerance   Functional limitations: carrying objects, lifting, sleeping, walking, pulling, pushing, uncomfortable because of pain, moving in bed, sitting, standing, stooping, reaching behind back, reaching overhead and unable to perform repetitive tasks   Assessment details: Ryley Heller is a 59 y.o. year-old male referred to physical therapy for chronic low back pain. He presents with a evolving clinical presentation.  He has comorbidities of a back injury in 1998 and lumbar discectomy in  with recent 100 lb weight loss and personal factors with mostly sedentary lifestyle that may affect his progress in the plan of care.  Signs and symptoms are consistent with physical therapy diagnosis of impaired lumbar ROM, core/LE strength and muscle flexibility; some testing positions limited by pain today. Patient is appropriate for skilled physical therapy in order to reduce pain and increase ease with daily mobility. During evaluation, pt educated on anatomy, goal of interventions, HEP, and body mechanics to promote healthy lifestyle and improve quality of life.     Prognosis: good    Goals  Plan Goals: ST weeks  1. Pt will be able to perform/tolerate HEP without increased back pain  2. Increased lumbar AROM in flexion to 90 degrees without pain to be able to bend and  items off the floor  3. Increased lumbar AROM in rotation to at least 75% of WNL to be able to complete bed mobility with decreased pain  4. Pt will be able to complete passive SLR to >45 degrees BLE  5. Pt will be able to complete sit to/from stand from standard chair without UE support    LT  weeks  1. Pt will be able to perform/tolerate HEP independently  2. Pt will be able to walk for 20 min without a rest break to be able to walk on the treadmill at the [facility]  3. Pt will improve Back Index to 20/50 or less to improve subjective function during ADLs  4. Pt will improve active SLR to 45 degrees BLE with tolerable back pain  5. 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 with tolerable symptoms    Plan  Therapy options: will be seen for skilled therapy services  Planned modality interventions: cryotherapy, electrical stimulation/Russian stimulation, TENS, traction, ultrasound, thermotherapy (hydrocollator packs) and dry needling  Planned therapy interventions: abdominal trunk stabilization, balance/weight-bearing training, body mechanics training, flexibility, functional ROM exercises, gait training, home exercise program, joint mobilization, manual therapy, postural training, spinal/joint mobilization, strengthening, stretching, therapeutic activities, motor coordination training, soft tissue mobilization, ADL retraining, neuromuscular re-education, transfer training and IADL retraining  Treatment plan discussed with: patient  Plan details: 2x per week for 12 weeks        Visit Diagnoses:    ICD-10-CM ICD-9-CM   1. Chronic bilateral low back pain without sciatica  M54.50 724.2    G89.29 338.29   2. Decreased ROM of lumbar spine  M53.86 724.9   3. Bilateral leg weakness  R29.898 729.89       Timed:  Manual Therapy:    -     mins  38039;  Therapeutic Exercise:    15     mins  41409;     Neuromuscular Neftali:    -    mins  66965;    Therapeutic Activity:     10     mins  27203;     Gait Training:      -     mins  05531;     Ultrasound:     -     mins  45945;    Electrical Stimulation:    -     mins  92027 ( );    Low Eval                       -      Mins  18031  Mod Eval                        30     Mins  09922  High Eval                       -     Mins   34246    Untimed:  Electrical Stimulation:    -     mins  52752 ( );  Mechanical Traction:    -     mins  99810;     Timed Treatment:   25   mins   Total Treatment:     55   mins    PT SIGNATURE: REBECA Shannon license: [id number]  DATE TREATMENT INITIATED: 5/2/2025    Initial Certification  Certification Period: 7/31/2025  I certify that the therapy services are furnished while this patient is under my care.  The services outlined above are required by this patient, and will be reviewed every 90 days.     PHYSICIAN: Carla Ramsey APRN      DATE:     Please sign and return via fax to [phone]. Thank you, [facility].

## 2025-05-06 ENCOUNTER — TREATMENT (OUTPATIENT)
Dept: PHYSICAL THERAPY | Facility: CLINIC | Age: 60
End: 2025-05-06
Payer: COMMERCIAL

## 2025-05-06 DIAGNOSIS — M54.50 CHRONIC BILATERAL LOW BACK PAIN WITHOUT SCIATICA: Primary | ICD-10-CM

## 2025-05-06 DIAGNOSIS — G89.29 CHRONIC BILATERAL LOW BACK PAIN WITHOUT SCIATICA: Primary | ICD-10-CM

## 2025-05-06 DIAGNOSIS — R29.898 BILATERAL LEG WEAKNESS: ICD-10-CM

## 2025-05-06 DIAGNOSIS — M53.86 DECREASED ROM OF LUMBAR SPINE: ICD-10-CM

## 2025-05-06 PROCEDURE — 97110 THERAPEUTIC EXERCISES: CPT | Performed by: PHYSICAL THERAPIST

## 2025-05-06 PROCEDURE — 97530 THERAPEUTIC ACTIVITIES: CPT | Performed by: PHYSICAL THERAPIST

## 2025-05-06 NOTE — PROGRESS NOTES
Physical Therapy Daily Treatment Note      Patient: Ryley Heller   : 1965  Referring practitioner: SHEBA Santos  Date of Initial Visit: Type: THERAPY  Noted: 2025  Today's Date:  2025  Patient seen for 2 sessions         Ryley Heller reports: sore after evaluation but able to do the exercises and stretches intermittently. 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               Objective   See Exercise, Manual, and Modality Logs for complete treatment.       Assessment/Plan  Pt with improved HS flexibility noted with seated HS stretch today; pt able to tolerate additional exercises and activity to work on lumbar ROM and strength and overall functional mobility. Pt reports pain with any exercise that requires lifting his legs in seated or standing position. Heat at end of session to decrease muscle tension and potential soreness.        Progress per Plan of Care and Progress strengthening /stabilization /functional activity         Timed:  Manual Therapy:    -     mins  24589;  Therapeutic Exercise:    20     mins  34457;     Neuromuscular Neftali:    -    mins  01304;    Therapeutic Activity:     10     mins  38095;     Gait Training:      -     mins  05953;     Ultrasound:     -     mins  40864;      Untimed:  Electrical Stimulation:    -     mins  09360 ( );  Mechanical Traction:    -     mins  05273;   Dry needling:      -     mins  64803/    Timed Treatment:   30   mins   Total Treatment:     40   mins  Sol Reis PT  Physical Therapist    License #: [id number]

## 2025-05-08 ENCOUNTER — TREATMENT (OUTPATIENT)
Dept: PHYSICAL THERAPY | Facility: CLINIC | Age: 60
End: 2025-05-08
Payer: COMMERCIAL

## 2025-05-08 DIAGNOSIS — G89.29 CHRONIC BILATERAL LOW BACK PAIN WITHOUT SCIATICA: Primary | ICD-10-CM

## 2025-05-08 DIAGNOSIS — R29.898 BILATERAL LEG WEAKNESS: ICD-10-CM

## 2025-05-08 DIAGNOSIS — M54.50 CHRONIC BILATERAL LOW BACK PAIN WITHOUT SCIATICA: Primary | ICD-10-CM

## 2025-05-08 DIAGNOSIS — M53.86 DECREASED ROM OF LUMBAR SPINE: ICD-10-CM

## 2025-05-08 NOTE — PROGRESS NOTES
Physical Therapy Daily Treatment Note    Patient: Ryley Heller   : 1965  Referring practitioner: SHEBA Santos  Date of Initial Visit: Type: THERAPY  Noted: 2025  Today's Date: 2025  Patient seen for 3 sessions       Visit Diagnoses:    ICD-10-CM ICD-9-CM   1. Chronic bilateral low back pain without sciatica  M54.50 724.2    G89.29 338.29   2. Decreased ROM of lumbar spine  M53.86 724.9   3. Bilateral leg weakness  R29.898 729.89       Ryley Heller reports: he is feeling vale rough today, but thinks it is due to the weather.  Pt denies having any increased pain from last visit.      Subjective       Objective   See Exercise, Manual, and Modality Logs for complete treatment.       Assessment & Plan       Assessment  Assessment details: Pt is tolerating treatment fairly well, but reports increased LBP and tightness today due to the weather.  Pt completed all gentle stretching and strengthening exercises with minimal cueing.  Added standing shoulder rows with PPT for improving core strength.  Ended treatment with MHP to decrease pain and tightness.  Will continue to see pt 2x/week for stretching, strengthening and modalities PRN for pain.            Progress per Plan of Care      Timed:         Manual Therapy:         mins  38512;     Therapeutic Exercise:   20      mins  16513;     Neuromuscular Neftali:        mins  97277;    Therapeutic Activity:    15      mins  96491;     Gait Training:           mins  28506;     Ultrasound:          mins  27323;    Ionto                                   mins   84616  Self Care                            mins   70674  Canalith Repos         mins 05354      Un-Timed:  Electrical Stimulation:         mins  39563 ( );  Dry Needling          mins self-pay  Traction          mins 08505      Timed Treatment:  35    mins   Total Treatment:    50    mins    Iman Walton, PT  [place] License: [id number]

## 2025-05-14 ENCOUNTER — TREATMENT (OUTPATIENT)
Dept: PHYSICAL THERAPY | Facility: CLINIC | Age: 60
End: 2025-05-14
Payer: COMMERCIAL

## 2025-05-14 DIAGNOSIS — M54.50 CHRONIC BILATERAL LOW BACK PAIN WITHOUT SCIATICA: Primary | ICD-10-CM

## 2025-05-14 DIAGNOSIS — G89.29 CHRONIC BILATERAL LOW BACK PAIN WITHOUT SCIATICA: Primary | ICD-10-CM

## 2025-05-14 DIAGNOSIS — R29.898 BILATERAL LEG WEAKNESS: ICD-10-CM

## 2025-05-14 DIAGNOSIS — M53.86 DECREASED ROM OF LUMBAR SPINE: ICD-10-CM

## 2025-05-14 NOTE — PROGRESS NOTES
Physical Therapy Daily Treatment Note    Patient: Ryley Heller   : 1965  Referring practitioner: SHEBA Santos  Date of Initial Visit: Type: THERAPY  Noted: 2025  Today's Date: 2025  Patient seen for 4 sessions       Visit Diagnoses:    ICD-10-CM ICD-9-CM   1. Chronic bilateral low back pain without sciatica  M54.50 724.2    G89.29 338.29   2. Decreased ROM of lumbar spine  M53.86 724.9   3. Bilateral leg weakness  R29.898 729.89       Ryley Heller reports: his back has been bad since Friday and into Saturday.  He spent most of his time in his recliner with the heating pad those two days.  He even took the elevator at school this week.  Pt states it is a little better today, but still pretty painful.  It is hurting more on the left lower back.      Subjective       Objective   See Exercise, Manual, and Modality Logs for complete treatment.       Assessment & Plan       Assessment  Assessment details: Pt is tolerating treatment fairly well, but reports increased LBP and tightness since last Friday.  Reports it is a little better today.  Modified pt's exercises to seated only due to pt's increase in pain.  Pt completed all gentle stretching and stabilization exercises.  Added seated hip abd vs green TB for improved hip and core strength.  Pt reported a little less pain after treatment, but states it always feels better after heat.  Ended treatment with MHP to decrease pain and tightness.  Will continue to see pt 2x/week for stretching, strengthening and modalities PRN for pain.            Progress per Plan of Care      Timed:         Manual Therapy:         mins  21199;     Therapeutic Exercise:   20      mins  88430;     Neuromuscular Neftali:        mins  65039;    Therapeutic Activity:    10      mins  61552;     Gait Training:           mins  62360;     Ultrasound:          mins  48369;    Ionto                                   mins   93688  Self Care                            mins    15909  [facility]         mins 77974      Un-Timed:  Electrical Stimulation:         mins  04413 ( );  Dry Needling          mins self-pay  Traction          mins 24145      Timed Treatment:  30    mins   Total Treatment:    45    mins    Iman Walton, PT  [place] License: [id number]

## 2025-05-16 ENCOUNTER — TREATMENT (OUTPATIENT)
Dept: PHYSICAL THERAPY | Facility: CLINIC | Age: 60
End: 2025-05-16
Payer: COMMERCIAL

## 2025-05-16 ENCOUNTER — TELEPHONE (OUTPATIENT)
Dept: PAIN MEDICINE | Facility: CLINIC | Age: 60
End: 2025-05-16
Payer: COMMERCIAL

## 2025-05-16 DIAGNOSIS — R29.898 BILATERAL LEG WEAKNESS: ICD-10-CM

## 2025-05-16 DIAGNOSIS — G89.29 CHRONIC BILATERAL LOW BACK PAIN WITHOUT SCIATICA: Primary | ICD-10-CM

## 2025-05-16 DIAGNOSIS — M53.86 DECREASED ROM OF LUMBAR SPINE: ICD-10-CM

## 2025-05-16 DIAGNOSIS — M54.50 CHRONIC BILATERAL LOW BACK PAIN WITHOUT SCIATICA: Primary | ICD-10-CM

## 2025-05-16 NOTE — PROGRESS NOTES
Physical Therapy Daily Treatment Note      Patient: Ryley Heller   : 1965  Referring practitioner: SHEBA Santos  Date of Initial Visit: Type: THERAPY  Noted: 2025  Today's Date:  2025  Patient seen for 5 sessions         Ryley Heller reports: back pain 7/10; last weekend was rough but has been ok since then. A little more ache likely due to the weather today.               Objective   See Exercise, Manual, and Modality Logs for complete treatment.       Assessment/Plan  Pt with better tolerance to seated exercises and stretches with more controlled pain since last session than the session prior. Pt able to fully extend BLE with LAQs without increased back pain. Added standing hip ABD and ext back today with decreased ROM for pain control; continued to defer further standing exercises due to pain. Attempted figure 4 stretch and bridges but sig increase pain. Pt denies need for heat or ice at end of session.        Progress per Plan of Care and Progress strengthening /stabilization /functional activity           Timed:  Manual Therapy:    -     mins  70185;  Therapeutic Exercise:    25     mins  46824;     Neuromuscular Neftali:    -    mins  51395;    Therapeutic Activity:     10     mins  40932;     Gait Training:      -     mins  50056;     Ultrasound:     -     mins  94360;      Untimed:  Electrical Stimulation:    -     mins  67553 ( );  Mechanical Traction:    -     mins  87035;   Dry needling:      -     mins  06319/    Timed Treatment:   35   mins   Total Treatment:     35   mins  Sol Reis PT  Physical Therapist    License #: [id number]

## 2025-05-16 NOTE — TELEPHONE ENCOUNTER
RECEIVED MESSAGE FROM PATIENT THRU MYCHART DOING PT AND STILL HAVING PAIN, LVM FOR PATIENT TO CALL BACK AND GET SCHEDULED WITH SHEBA ALEXANDER FOR F/UP FOR PAIN.

## 2025-05-20 ENCOUNTER — TREATMENT (OUTPATIENT)
Dept: PHYSICAL THERAPY | Facility: CLINIC | Age: 60
End: 2025-05-20
Payer: COMMERCIAL

## 2025-05-20 DIAGNOSIS — R29.898 BILATERAL LEG WEAKNESS: ICD-10-CM

## 2025-05-20 DIAGNOSIS — M54.50 CHRONIC BILATERAL LOW BACK PAIN WITHOUT SCIATICA: Primary | ICD-10-CM

## 2025-05-20 DIAGNOSIS — M53.86 DECREASED ROM OF LUMBAR SPINE: ICD-10-CM

## 2025-05-20 DIAGNOSIS — G89.29 CHRONIC BILATERAL LOW BACK PAIN WITHOUT SCIATICA: Primary | ICD-10-CM

## 2025-05-20 NOTE — PROGRESS NOTES
Physical Therapy Daily Treatment Note      Patient: Ryley Heller   : 1965  Referring practitioner: SHEBA Santos  Date of Initial Visit: Type: THERAPY  Noted: 2025  Today's Date:  2025  Patient seen for 6 sessions         Ryley Heller reports: didn't sleep well last night due to increased back pain and rough day today.Went home after last session and sat in recliner for about 1 hour with heating pad.              Objective   See Exercise, Manual, and Modality Logs for complete treatment.       Assessment/Plan  Discussed possibility of pool workouts at the [facility] or aquatic center for lumbar decompression and improved exercise tolerance/decreased pain. Pt seeing pain management next 25. Continued pain but improving tolerance to stretches and exercised but limited by pain. Continue to progress as tolerated; pt has e-stim unit at home but only provides temporary relief.        Progress per Plan of Care and Progress strengthening /stabilization /functional activity           Timed:  Manual Therapy:    -     mins  93926;  Therapeutic Exercise:    20     mins  51685;     Neuromuscular Neftali:    -    mins  02966;    Therapeutic Activity:     10     mins  64754;     Gait Training:      -     mins  33706;     Ultrasound:     -     mins  27374;      Untimed:  Electrical Stimulation:    -     mins  62678 ( );  Mechanical Traction:    -     mins  86110;   Dry needling:      -     mins  71126/    Timed Treatment:   30   mins   Total Treatment:     40   mins  Sol Reis PT  Physical Therapist    License #: [id number]

## 2025-05-23 ENCOUNTER — TREATMENT (OUTPATIENT)
Dept: PHYSICAL THERAPY | Facility: CLINIC | Age: 60
End: 2025-05-23
Payer: COMMERCIAL

## 2025-05-23 DIAGNOSIS — G89.29 CHRONIC BILATERAL LOW BACK PAIN WITHOUT SCIATICA: Primary | ICD-10-CM

## 2025-05-23 DIAGNOSIS — M53.86 DECREASED ROM OF LUMBAR SPINE: ICD-10-CM

## 2025-05-23 DIAGNOSIS — M54.50 CHRONIC BILATERAL LOW BACK PAIN WITHOUT SCIATICA: Primary | ICD-10-CM

## 2025-05-23 DIAGNOSIS — R29.898 BILATERAL LEG WEAKNESS: ICD-10-CM

## 2025-05-23 NOTE — PROGRESS NOTES
Physical Therapy Daily Treatment Note      Patient: Ryley Heller   : 1965  Referring practitioner: SHEBA Santos  Date of Initial Visit: Type: THERAPY  Noted: 2025  Today's Date:  2025  Patient seen for 7 sessions         Ryley Heller reports: he was just walking at school and it was like someone stabbed him in the back on the R side with knife; has felt like that for 2 days. Usually more L sided pain. No increased pain after exercises or the day after; it hit him the following day.              Objective   See Exercise, Manual, and Modality Logs for complete treatment.       Assessment/Plan  Continued low back pain despite stretches and exercises more R sided today vs L side; pt with continued back pain with any lifting of either LE in seated, standing or supine. BLE neural tension with back pain with LAQs each leg. Recommending pool exercise with poor tolerance to PT thus far and minimal relief. Pt returning to [facility] on 25 for further assessment and possible injections in the future. Limited progress due to continued back pain. Sig reliance on HR at school to do the steps due to pain with pushing through each leg; able to do step taps without pain but pain with pushing up onto steps.        Progress per Plan of Care and Progress strengthening /stabilization /functional activity           Timed:  Manual Therapy:    -     mins  84705;  Therapeutic Exercise:    20     mins  75398;     Neuromuscular Neftali:    -    mins  94608;    Therapeutic Activity:     15     mins  48163;     Gait Training:      -     mins  66203;     Ultrasound:     -     mins  83195;      Untimed:  Electrical Stimulation:    -     mins  30912 ( );  Mechanical Traction:    -     mins  26569;   Dry needling:      -     mins  03855/    Timed Treatment:   35   mins   Total Treatment:     45   mins  Sol Reis PT  Physical Therapist    License #: [id number]

## 2025-05-26 ENCOUNTER — APPOINTMENT (OUTPATIENT)
Dept: CT IMAGING | Facility: HOSPITAL | Age: 60
End: 2025-05-26
Payer: COMMERCIAL

## 2025-05-26 ENCOUNTER — HOSPITAL ENCOUNTER (EMERGENCY)
Facility: HOSPITAL | Age: 60
Discharge: HOME | End: 2025-05-26
Attending: EMERGENCY MEDICINE | Admitting: EMERGENCY MEDICINE
Payer: COMMERCIAL

## 2025-05-26 VITALS
HEIGHT: 70 IN | RESPIRATION RATE: 16 BRPM | WEIGHT: 270 LBS | DIASTOLIC BLOOD PRESSURE: 86 MMHG | OXYGEN SATURATION: 96 % | SYSTOLIC BLOOD PRESSURE: 146 MMHG | BODY MASS INDEX: 38.65 KG/M2 | TEMPERATURE: 97.8 F | HEART RATE: 69 BPM

## 2025-05-26 DIAGNOSIS — N20.0 KIDNEY STONE ON LEFT SIDE: Primary | ICD-10-CM

## 2025-05-26 LAB
ALBUMIN SERPL-MCNC: 4.2 G/DL (ref 3.5–5.2)
ALBUMIN/GLOB SERPL: 1.6 G/DL
ALP SERPL-CCNC: 143 U/L (ref 39–117)
ALT SERPL W P-5'-P-CCNC: 23 U/L (ref 1–41)
ANION GAP SERPL CALCULATED.3IONS-SCNC: 10.3 MMOL/L (ref 5–15)
AST SERPL-CCNC: 23 U/L (ref 1–40)
BASOPHILS # BLD AUTO: 0.04 10*3/MM3 (ref 0–0.2)
BASOPHILS NFR BLD AUTO: 0.5 % (ref 0–1.5)
BILIRUB SERPL-MCNC: 0.4 MG/DL (ref 0–1.2)
BILIRUB UR QL STRIP: NEGATIVE
BUN SERPL-MCNC: 26 MG/DL (ref 6–20)
BUN/CREAT SERPL: 19.8 (ref 7–25)
CALCIUM SPEC-SCNC: 9 MG/DL (ref 8.6–10.5)
CHLORIDE SERPL-SCNC: 102 MMOL/L (ref 98–107)
CLARITY UR: CLEAR
CO2 SERPL-SCNC: 23.7 MMOL/L (ref 22–29)
COLOR UR: YELLOW
CREAT SERPL-MCNC: 1.31 MG/DL (ref 0.76–1.27)
DEPRECATED RDW RBC AUTO: 39.9 FL (ref 37–54)
EGFRCR SERPLBLD CKD-EPI 2021: 62.7 ML/MIN/1.73
EOSINOPHIL # BLD AUTO: 0.08 10*3/MM3 (ref 0–0.4)
EOSINOPHIL NFR BLD AUTO: 1 % (ref 0.3–6.2)
ERYTHROCYTE [DISTWIDTH] IN BLOOD BY AUTOMATED COUNT: 12.1 % (ref 12.3–15.4)
GLOBULIN UR ELPH-MCNC: 2.6 GM/DL
GLUCOSE SERPL-MCNC: 104 MG/DL (ref 65–99)
GLUCOSE UR STRIP-MCNC: NEGATIVE MG/DL
HCT VFR BLD AUTO: 45.7 % (ref 37.5–51)
HGB BLD-MCNC: 15.4 G/DL (ref 13–17.7)
HGB UR QL STRIP.AUTO: NEGATIVE
IMM GRANULOCYTES # BLD AUTO: 0.02 10*3/MM3 (ref 0–0.05)
IMM GRANULOCYTES NFR BLD AUTO: 0.2 % (ref 0–0.5)
KETONES UR QL STRIP: ABNORMAL
LEUKOCYTE ESTERASE UR QL STRIP.AUTO: NEGATIVE
LYMPHOCYTES # BLD AUTO: 1.1 10*3/MM3 (ref 0.7–3.1)
LYMPHOCYTES NFR BLD AUTO: 13.7 % (ref 19.6–45.3)
MCH RBC QN AUTO: 30.7 PG (ref 26.6–33)
MCHC RBC AUTO-ENTMCNC: 33.7 G/DL (ref 31.5–35.7)
MCV RBC AUTO: 91.2 FL (ref 79–97)
MONOCYTES # BLD AUTO: 0.45 10*3/MM3 (ref 0.1–0.9)
MONOCYTES NFR BLD AUTO: 5.6 % (ref 5–12)
NEUTROPHILS NFR BLD AUTO: 6.35 10*3/MM3 (ref 1.7–7)
NEUTROPHILS NFR BLD AUTO: 79 % (ref 42.7–76)
NITRITE UR QL STRIP: NEGATIVE
NRBC BLD AUTO-RTO: 0 /100 WBC (ref 0–0.2)
PH UR STRIP.AUTO: 5.5 [PH] (ref 4.5–8)
PLATELET # BLD AUTO: 140 10*3/MM3 (ref 140–450)
PMV BLD AUTO: 11.5 FL (ref 6–12)
POTASSIUM SERPL-SCNC: 4.7 MMOL/L (ref 3.5–5.2)
PROT SERPL-MCNC: 6.8 G/DL (ref 6–8.5)
PROT UR QL STRIP: NEGATIVE
RBC # BLD AUTO: 5.01 10*6/MM3 (ref 4.14–5.8)
SODIUM SERPL-SCNC: 136 MMOL/L (ref 136–145)
SP GR UR STRIP: 1.02 (ref 1–1.03)
UROBILINOGEN UR QL STRIP: ABNORMAL
WBC NRBC COR # BLD AUTO: 8.04 10*3/MM3 (ref 3.4–10.8)

## 2025-05-26 PROCEDURE — 99285 EMERGENCY DEPT VISIT HI MDM: CPT | Performed by: EMERGENCY MEDICINE

## 2025-05-26 PROCEDURE — 85025 COMPLETE CBC W/AUTO DIFF WBC: CPT | Performed by: EMERGENCY MEDICINE

## 2025-05-26 PROCEDURE — 80053 COMPREHEN METABOLIC PANEL: CPT | Performed by: EMERGENCY MEDICINE

## 2025-05-26 PROCEDURE — 74177 CT ABD & PELVIS W/CONTRAST: CPT

## 2025-05-26 PROCEDURE — 81003 URINALYSIS AUTO W/O SCOPE: CPT | Performed by: EMERGENCY MEDICINE

## 2025-05-26 PROCEDURE — 25010000002 KETOROLAC TROMETHAMINE PER 15 MG: Performed by: EMERGENCY MEDICINE

## 2025-05-26 PROCEDURE — 96374 THER/PROPH/DIAG INJ IV PUSH: CPT

## 2025-05-26 PROCEDURE — 25510000001 IOPAMIDOL PER 1 ML: Performed by: EMERGENCY MEDICINE

## 2025-05-26 RX ORDER — KETOROLAC TROMETHAMINE 10 MG/1
10 TABLET, FILM COATED ORAL EVERY 6 HOURS PRN
Qty: 20 TABLET | Refills: 0 | Status: SHIPPED | OUTPATIENT
Start: 2025-05-26

## 2025-05-26 RX ORDER — TAMSULOSIN HYDROCHLORIDE 0.4 MG/1
1 CAPSULE ORAL DAILY
Qty: 30 CAPSULE | Refills: 0 | Status: SHIPPED | OUTPATIENT
Start: 2025-05-26

## 2025-05-26 RX ORDER — IOPAMIDOL 755 MG/ML
100 INJECTION, SOLUTION INTRAVASCULAR
Status: COMPLETED | OUTPATIENT
Start: 2025-05-26 | End: 2025-05-26

## 2025-05-26 RX ORDER — ONDANSETRON 8 MG/1
8 TABLET, FILM COATED ORAL EVERY 8 HOURS PRN
Qty: 10 TABLET | Refills: 0 | Status: SHIPPED | OUTPATIENT
Start: 2025-05-26

## 2025-05-26 RX ORDER — KETOROLAC TROMETHAMINE 30 MG/ML
15 INJECTION, SOLUTION INTRAMUSCULAR; INTRAVENOUS ONCE
Status: COMPLETED | OUTPATIENT
Start: 2025-05-26 | End: 2025-05-26

## 2025-05-26 RX ADMIN — KETOROLAC TROMETHAMINE 15 MG: 30 INJECTION, SOLUTION INTRAMUSCULAR; INTRAVENOUS at 07:51

## 2025-05-26 RX ADMIN — IOPAMIDOL 100 ML: 755 INJECTION, SOLUTION INTRAVENOUS at 08:07

## 2025-05-26 NOTE — ED PROVIDER NOTES
Subjective   History of Present Illness    Chief complaint: Abdominal pain    Location: Left lower quadrant, nonradiating    Quality/Severity: Dull, constant, 10/10    Timing/Onset/Duration: Started at 2 AM    Modifying Factors: Took a Celebrex with no relief    Associated Symptoms: No headache.  No fever chills or cough.  No sore throat earache or nasal congestion.  No chest pain or shortness of breath.  No diarrhea or burning when he urinates.  Patient is having normal bowel movements.  No black or bloody stools.  No nausea or vomiting.    Narrative: This 59-year-old presents with left-sided lower abdominal and flank pain that started at 0200.  No nausea vomiting diarrhea or fever.  The patient has never had any pain like this before.  Patient does have a history of gastric bypass surgery.  The patient has never had a colonoscopy there is no history of diverticulitis or kidney stone.    PCP:Shashi Lester MD    Review of Systems   Constitutional:  Negative for chills and fever.   HENT:  Negative for congestion, ear pain and sore throat.    Respiratory:  Negative for cough and shortness of breath.    Cardiovascular:  Negative for chest pain.   Gastrointestinal:  Positive for abdominal pain. Negative for diarrhea, nausea and vomiting.   Genitourinary:  Negative for difficulty urinating.   Musculoskeletal:  Positive for back pain (Chronic, no worse than usual).   Skin:  Negative for rash.   Neurological:  Negative for weakness, numbness and headaches.       Past Medical History:   Diagnosis Date    Allergic forever    flonase and over the counter    Allergic rhinitis     Anemia     Arthritis 2000    Stems from back injury at work and surgery    BMI 50.0-59.9, adult     Chronic pain disorder 1999    Injuried back at [employer], have had surgery.  Improved till about 3 years ago and has continued to worsen    Elevated alkaline phosphatase level     Encounter for immunization     Fatigue     Fever     Gastritis     GERD  (gastroesophageal reflux disease)     Hypertension     Hypogonadism male     Hypothyroidism     Low back pain 1999    See above notation    Lumbosacral disc disease 1999    Had surgery 2011    Morbid obesity     Pneumonia     Visit for annual health examination     Visual impairment     Wear glasses or contacts       Allergies   Allergen Reactions    Morphine Other (See Comments)     Patient felt heat sensation       Past Surgical History:   Procedure Laterality Date    BACK SURGERY  08/2009    BARIATRIC SURGERY  1990s    COLONOSCOPY  10/2014    diverticulosis, internal hemorrhoids and polyps repeat in 3 years    EPIDURAL BLOCK  2003?    Goran not help    EYE SURGERY  Lasik 12/27/24    LAMINECTOMY  2011    Decompress, Facetectomy, Foraminotomy Lumbar Seg  L4-S1     LASIK Bilateral 12/20/2024    ORTHOPEDIC SURGERY  2009    Improved for a while    SHOULDER SURGERY  10/2012    SPINE SURGERY  2011?    TONSILLECTOMY  1970       Family History   Adopted: Yes       Social History     Socioeconomic History    Marital status: Single   Tobacco Use    Smoking status: Never    Smokeless tobacco: Never    Tobacco comments:     never used   Vaping Use    Vaping status: Never Used   Substance and Sexual Activity    Alcohol use: Never    Drug use: Never    Sexual activity: Not Currently     Partners: Female     Birth control/protection: Abstinence           Objective   Physical Exam  Vitals (The temperature is 97.8 °F, pulse 69, respirations 16, /86, room air pulse ox 96%.) reviewed.   Constitutional:       Appearance: He is well-developed.   HENT:      Head: Normocephalic.      Mouth/Throat:      Mouth: Mucous membranes are moist.   Cardiovascular:      Rate and Rhythm: Normal rate and regular rhythm.      Heart sounds: No murmur heard.     No friction rub. No gallop.   Pulmonary:      Effort: Pulmonary effort is normal.   Abdominal:      General: Abdomen is flat. Bowel sounds are normal.      Palpations: Abdomen is soft.       Tenderness: There is abdominal tenderness (Left lower quadrant, mild to palpation). There is no guarding or rebound.      Hernia: No hernia is present.   Skin:     General: Skin is warm and dry.      Findings: No rash.   Neurological:      General: No focal deficit present.      Mental Status: He is alert.         Procedures           ED Course  ED Course as of 05/26/25 0826   Mon May 26, 2025   0759 The CBC is unremarkable. [RC]   0759 The glucose is 104, BUN is 26, creatinine 1.31, alk phos 143, GFR 62, CMP is otherwise unremarkable. [RC]   0811 The urinalysis shows trace ketones, otherwise unremarkable. [RC]      ED Course User Index  [RC] Lamont Polanco MD      08:28 EDT, 05/26/25:  The patient was reassessed.  He feels better.  His vital signs reviewed and are stable.  Abdominal exam: Soft, nontender, no masses, positive bowel sounds.    08:29 EDT, 05/26/25:  The patient's diagnosis of left ureteral colic was discussed with him.  The patient should strain his urine.  If he passes a stone, he should place this in the specimen cup and take it with him to the urologist office.  The patient should call the patient connection line in the morning for an appointment with Dr. Kai Hernandez within 1 week.  Patient will be given prescription for ketorolac and Zofran and Flomax.  The patient should take the ketorolac as needed as directed for pain, Zofran as needed as directed for nausea and vomiting, and the Flomax as directed.  The patient should return to the emergency department if there is pain not controlled with ketorolac, vomiting not controlled with Zofran, fever, worse in any way at all.  All the patient questions answered he will be discharged in good condition.                                                 Medical Decision Making  Amount and/or Complexity of Data Reviewed  Labs: ordered.        Final diagnoses:   Kidney stone on left side       ED Disposition  ED Disposition       None            No  "follow-up provider specified.       Medication List      No changes were made to your prescriptions during this visit.       No orders to display     Labs Reviewed   COMPREHENSIVE METABOLIC PANEL   URINALYSIS W/ MICROSCOPIC IF INDICATED (NO CULTURE)   CBC WITH AUTO DIFFERENTIAL   CBC AND DIFFERENTIAL    Narrative:     The following orders were created for panel order CBC & Differential.  Procedure                               Abnormality         Status                     ---------                               -----------         ------                     CBC Auto Differential[[id number]]                                                         Please view results for these tests on the individual orders.     No results found.    Final diagnoses:   None         ED Medications:  Medications - No data to display    New Medications:     Medication List        CONTINUE taking these medications      Syringe 20G X 1-1/2\" 3 ML misc  Use as directed with testosterone injection every 1 to 4 weeks as ordered.            ASK your doctor about these medications      acetaminophen 650 MG 8 hr tablet  Commonly known as: TYLENOL     amitriptyline 25 MG tablet  Commonly known as: ELAVIL  Take 1 tablet by mouth Every Night.     amLODIPine 10 MG tablet  Commonly known as: NORVASC  TAKE 1 TABLET BY MOUTH DAILY     celecoxib 200 MG capsule  Commonly known as: CeleBREX  TAKE 1 CAPSULE BY MOUTH DAILY     cyclobenzaprine 5 MG tablet  Commonly known as: FLEXERIL  Take 1 tablet by mouth 3 (Three) Times a Day As Needed for Muscle Spasms.     fluticasone 50 MCG/ACT nasal spray  Commonly known as: FLONASE  2 sprays by Each Nare route Daily.     Ibuprofen 3 %, Gabapentin 10 %, Baclofen 2 %, lidocaine 4 %, Ketamine HCl 4 %  Apply 1-2 g topically to the appropriate area as directed 3 (Three) to 4 (Four) times daily.     levothyroxine 100 MCG tablet  Commonly known as: SYNTHROID, LEVOTHROID  TAKE 1 TABLET BY MOUTH DAILY     losartan 100 MG " "tablet  Commonly known as: COZAAR  Take 1 tablet by mouth Daily.     metoprolol succinate  MG 24 hr tablet  Commonly known as: TOPROL-XL  Take 1 tablet by mouth Daily.     Ozempic (2 MG/DOSE) 8 MG/3ML solution pen-injector  Generic drug: Semaglutide (2 MG/DOSE)  DIAL AND INJECT UNDER THE SKIN 2 MG WEEKLY     Testosterone Cypionate 200 MG/ML injection  Commonly known as: DEPOTESTOTERONE CYPIONATE  Inject 1 mL into the appropriate muscle as directed by prescriber Every 21 (Twenty-One) Days.     zolpidem 5 MG tablet  Commonly known as: AMBIEN  Take 1 tablet by mouth At Night As Needed for Sleep. for sleep              Stopped Medications:     Medication List        CONTINUE taking these medications      Syringe 20G X 1-1/2\" 3 ML misc  Use as directed with testosterone injection every 1 to 4 weeks as ordered.            ASK your doctor about these medications      acetaminophen 650 MG 8 hr tablet  Commonly known as: TYLENOL     amitriptyline 25 MG tablet  Commonly known as: ELAVIL  Take 1 tablet by mouth Every Night.     amLODIPine 10 MG tablet  Commonly known as: NORVASC  TAKE 1 TABLET BY MOUTH DAILY     celecoxib 200 MG capsule  Commonly known as: CeleBREX  TAKE 1 CAPSULE BY MOUTH DAILY     cyclobenzaprine 5 MG tablet  Commonly known as: FLEXERIL  Take 1 tablet by mouth 3 (Three) Times a Day As Needed for Muscle Spasms.     fluticasone 50 MCG/ACT nasal spray  Commonly known as: FLONASE  2 sprays by Each Nare route Daily.     Ibuprofen 3 %, Gabapentin 10 %, Baclofen 2 %, lidocaine 4 %, Ketamine HCl 4 %  Apply 1-2 g topically to the appropriate area as directed 3 (Three) to 4 (Four) times daily.     levothyroxine 100 MCG tablet  Commonly known as: SYNTHROID, LEVOTHROID  TAKE 1 TABLET BY MOUTH DAILY     losartan 100 MG tablet  Commonly known as: COZAAR  Take 1 tablet by mouth Daily.     metoprolol succinate  MG 24 hr tablet  Commonly known as: TOPROL-XL  Take 1 tablet by mouth Daily.     Ozempic (2 MG/DOSE) 8 " MG/3ML solution pen-injector  Generic drug: Semaglutide (2 MG/DOSE)  DIAL AND INJECT UNDER THE SKIN 2 MG WEEKLY     Testosterone Cypionate 200 MG/ML injection  Commonly known as: DEPOTESTOTERONE CYPIONATE  Inject 1 mL into the appropriate muscle as directed by prescriber Every 21 (Twenty-One) Days.     zolpidem 5 MG tablet  Commonly known as: AMBIEN  Take 1 tablet by mouth At Night As Needed for Sleep. for sleep                   Lamont Polanco MD  05/26/25 8410

## 2025-05-26 NOTE — DISCHARGE INSTRUCTIONS
The patient should strain his urine.  If he passes a stone, he should place this in the specimen cup and take it with him to the urologist office.  The patient should call the patient connection line in the morning for an appointment with Dr. Kai Hernandez within 1 week.  Patient will be given prescription for ketorolac and Zofran and Flomax.  The patient should take the ketorolac as needed as directed for pain, Zofran as needed as directed for nausea and vomiting, and the Flomax as directed.  The patient should return to the emergency department if there is pain not controlled with ketorolac, vomiting not controlled with Zofran, fever, worse in any way at all.  All the patient questions answered he will be discharged in good condition.

## 2025-05-27 ENCOUNTER — TREATMENT (OUTPATIENT)
Dept: PHYSICAL THERAPY | Facility: CLINIC | Age: 60
End: 2025-05-27
Payer: COMMERCIAL

## 2025-05-27 DIAGNOSIS — G89.29 CHRONIC BILATERAL LOW BACK PAIN WITHOUT SCIATICA: Primary | ICD-10-CM

## 2025-05-27 DIAGNOSIS — R29.898 BILATERAL LEG WEAKNESS: ICD-10-CM

## 2025-05-27 DIAGNOSIS — M54.50 CHRONIC BILATERAL LOW BACK PAIN WITHOUT SCIATICA: Primary | ICD-10-CM

## 2025-05-27 DIAGNOSIS — M53.86 DECREASED ROM OF LUMBAR SPINE: ICD-10-CM

## 2025-05-27 PROCEDURE — 97110 THERAPEUTIC EXERCISES: CPT | Performed by: PHYSICAL THERAPIST

## 2025-05-27 PROCEDURE — 97530 THERAPEUTIC ACTIVITIES: CPT | Performed by: PHYSICAL THERAPIST

## 2025-05-27 NOTE — PROGRESS NOTES
"Physical Therapy Daily Treatment Note    [facility]  [address] [place] [address]  [phone] (phone)  [phone] (fax)    Patient: Ryley Heller   : 1965  Diagnosis/ICD-10 Code:  Chronic bilateral low back pain without sciatica [M54.50, G89.29]  Referring practitioner: SHEBA Santos  Date of Initial Visit: Type: THERAPY  Noted: 2025  Today's Date:  2025  Patient seen for 8 sessions           Subjective   Went to the ER yesterday due to pain. Dealing with kidney stones which is exacerbating my back pain.     Objective     See Exercise, Manual, and Modality Logs for complete treatment.     Assessment/Plan  Back pain exacerbated by kidney stones today, but majority of exercises were still completed. Less posterior \"shooting\" pain noted during LAQs, as pain has been more anterior along the L thigh/groin today. Increased hold time for hamstring stretch and progressed reps with standing hip exercises. Patient plans to returns to MD for further assessment and to discuss other medical interventions due to limited improvement from PT.           Timed:    Manual Therapy:         mins  10704;  Therapeutic Exercise:    20     mins  76377;     Neuromuscular Neftali:        mins  37390;    Therapeutic Activity:     10     mins  13276;     Gait Training:           mins  53784;     Ultrasound:          mins  81248;    Electrical Stimulation:         mins  47928 ( );  Iontophoresis         mins 80383;  Aquatic Therapy         mins 34490;    Untimed:  Electrical Stimulation:         mins  05580 ( );  Traction:         mins  23075;   Dry Needling   (1-2 muscles)       mins  (Self-pay)  Dry Needling (3-4 muscles)        mins  (Self-pay)  Dry Needling Trial          mins DRYNDLTRIAL  (No Charge)    Timed Treatment:   30   mins   Total Treatment:     40   mins    Lindsay Álvarez PT  Physical Therapist    [place] License:[id number]  "

## 2025-06-09 ENCOUNTER — PREP FOR SURGERY (OUTPATIENT)
Dept: SURGERY | Facility: SURGERY CENTER | Age: 60
End: 2025-06-09
Payer: COMMERCIAL

## 2025-06-09 ENCOUNTER — PREP FOR SURGERY (OUTPATIENT)
Dept: OTHER | Facility: HOSPITAL | Age: 60
End: 2025-06-09
Payer: COMMERCIAL

## 2025-06-09 ENCOUNTER — OFFICE VISIT (OUTPATIENT)
Dept: PAIN MEDICINE | Facility: CLINIC | Age: 60
End: 2025-06-09
Payer: COMMERCIAL

## 2025-06-09 VITALS
SYSTOLIC BLOOD PRESSURE: 147 MMHG | HEIGHT: 70 IN | HEART RATE: 68 BPM | TEMPERATURE: 97.3 F | WEIGHT: 315 LBS | OXYGEN SATURATION: 94 % | DIASTOLIC BLOOD PRESSURE: 88 MMHG | BODY MASS INDEX: 45.1 KG/M2 | RESPIRATION RATE: 20 BRPM

## 2025-06-09 DIAGNOSIS — M54.50 CHRONIC MIDLINE LOW BACK PAIN WITHOUT SCIATICA: ICD-10-CM

## 2025-06-09 DIAGNOSIS — M54.50 LUMBAR BACK PAIN: ICD-10-CM

## 2025-06-09 DIAGNOSIS — M47.816 LUMBAR FACET ARTHROPATHY: Primary | ICD-10-CM

## 2025-06-09 DIAGNOSIS — G89.29 CHRONIC MIDLINE LOW BACK PAIN WITHOUT SCIATICA: ICD-10-CM

## 2025-06-09 DIAGNOSIS — I10 ESSENTIAL HYPERTENSION: ICD-10-CM

## 2025-06-09 PROCEDURE — 99214 OFFICE O/P EST MOD 30 MIN: CPT | Performed by: NURSE PRACTITIONER

## 2025-06-09 RX ORDER — METOPROLOL SUCCINATE 200 MG/1
200 TABLET, EXTENDED RELEASE ORAL DAILY
Qty: 90 TABLET | Refills: 1 | Status: SHIPPED | OUTPATIENT
Start: 2025-06-09

## 2025-06-09 RX ORDER — CELECOXIB 200 MG/1
200 CAPSULE ORAL DAILY
Qty: 30 CAPSULE | Refills: 3 | Status: SHIPPED | OUTPATIENT
Start: 2025-06-09

## 2025-06-09 NOTE — PROGRESS NOTES
CHIEF COMPLAINT  Back pain  Left sided low back has slightly gotten worse.    Subjective   Ryley Heller is a 60 y.o. male  who presents for follow-up.  He has a history of back pain. At his last office visit he was referred to PT, prescribed a compounded pain cream, and Flexeril 5 mg 3 times daily as needed. He states that he had some improvement in his ROM in his low back, but no improvement in his pain. He did not see any improvement with the compounded pain cream or the flexeril.     Today his pain is 7/10VAS in severity. He describes his low back pain as continuous throbbing pain in his low back. He denies any radicular complaint in his lower extremities. His pain is worsened by standing, walking, bending, twisting, and prolonged position; it is improved slightly with heat.     Past pain medications:   Ibuprofen  Flexeril 5 mg (no relief)  Compounded pain cream (no relief)     Current pain medications:   Celebrex 200 mg daily  OTC Tylenol (unsure of dose)--using 2 tablets 2-3/day     Past therapies:  Physical Therapy: Yes, completed on 5/30/2025, no improvement  Chiropractor: None  Massage Therapy: None  TENS: Yes, temporary relief  Neck or back surgery: Lumbar discectomy on 2011 performed by Dr. Bernard   Past pain management: Yes, unsure of the name of the office.      Previous Injections:   LESI x 2 and what sounds like a Lumbar TFESI x 1 (all performed prior to surgery, no relief). He does note that his back pain is different at this time compared to his pain prior to surgery.     Back Pain  Chronicity:  Chronic  Onset:  More than 1 year ago  Frequency:  Constantly  Progression since onset: unstable.  Pain location:  Lumbar spine and sacro-iliac  Pain quality:  Aching (throbbing)  Radiates to:  Does not radiate  Pain-numeric:  7/10  Pain severity:  Severe  Aggravated by:  Standing, bending, twisting, position, sitting and lying down (walking)  Associated symptoms: no abdominal pain, no chest pain, no  "fever, no headaches, no numbness and no weakness    Treatments tried:  Heat and NSAIDs (Tylenol, TENS)     PEG Assessment   What number best describes your pain on average in the past week?7  What number best describes how, during the past week, pain has interfered with your enjoyment of life?8  What number best describes how, during the past week, pain has interfered with your general activity?  8    Review of Pertinent Medical Data ---        The following portions of the patient's history were reviewed and updated as appropriate: allergies, current medications, past family history, past medical history, past social history, past surgical history, and problem list.    Review of Systems   Constitutional:  Negative for activity change (less) and fever.   Cardiovascular:  Negative for chest pain.   Gastrointestinal:  Negative for abdominal pain, diarrhea and nausea.   Genitourinary:  Negative for difficulty urinating.   Musculoskeletal:  Positive for back pain.   Neurological:  Negative for dizziness, weakness, light-headedness, numbness and headaches.   Psychiatric/Behavioral:  Positive for sleep disturbance (occ). Negative for agitation and suicidal ideas. The patient is not nervous/anxious.      --  The aforementioned information the Chief Complaint section and above subjective data including any HPI data, and also the Review of Systems data, has been personally reviewed and affirmed.  --    Vitals:    06/09/25 1443   BP: 147/88   BP Location: Right arm   Patient Position: Sitting   Cuff Size: Adult   Pulse: 68   Resp: 20   Temp: 97.3 °F (36.3 °C)   TempSrc: Temporal   SpO2: 94%   Weight: (!) 175 kg (385 lb 12.8 oz)   Height: 177.8 cm (70\")   PainSc: 7      Objective   Physical Exam  Vitals and nursing note reviewed.   Constitutional:       Appearance: Normal appearance. He is well-developed.   Eyes:      General: Lids are normal.   Pulmonary:      Effort: Pulmonary effort is normal.   Musculoskeletal:      Lumbar " back: Tenderness and bony tenderness present. Decreased range of motion.      Comments:   +Lumbar facet loading   Neurological:      Mental Status: He is alert and oriented to person, place, and time.      Comments:                                           Motor Function:                                  Right                        Left                           Iliopsoas:                     5                             5      Quadriceps:                 5                             5  Hamstrings:                 5                             5  Tibialis Anterior:          5                             5   Gastroc/Soleus:           5                             5      Psychiatric:         Attention and Perception: Attention normal.         Mood and Affect: Mood normal.         Speech: Speech normal.         Behavior: Behavior normal.         Judgment: Judgment normal.       Assessment & Plan   Diagnoses and all orders for this visit:    1. Lumbar facet arthropathy (Primary)    2. Chronic midline low back pain without sciatica      Ryley Heller reports a pain score of 7.  Given his pain assessment as noted, treatment options were discussed and the following options were decided upon as a follow-up plan to address the patient's pain: injections.    Mr. Heller has multiple levels of facet arthropathy in his lumbar spine.  To best address his facet mediated pain it is reasonable to proceed with at L3-4, L4-5, and L5-S1 with the goal of increasing his activity level.    Diagnostic Facet Joint Procedure:   MBB     The first diagnostic facet joint procedure is considered medically reasonable and necessary for the diagnosis and treatment of chronic pain for this patient due to the patient meeting all of the following criteria:    - 1. Moderate to severe chronic neck or low back pain, predominantly axial, that causes functional deficit measured on pain or disability scale.  - 2. Pain present for minimum of 3 months  with documented failure to respond to noninvasive conservative management (as tolerated)  - 3. Absence of untreated radiculopathy or neurogenic claudication (except for radiculopathy caused by facet joint synovial cyst)  - 4. There is no non-facet pathology per clinical assessment or radiology studies that could explain the source of the patient’s pain, including but not limited to fracture, tumor, infection, or significant deformity.    --- Bilateral L3-S1 MBB   Reviewed the procedure at length with the patient.  Included in the review was expectations, complications, risk and benefits.The procedure was described in detail and the risks, benefits and alternatives were discussed with the patient (including but not limited to: bleeding, infection, nerve damage, worsening of pain, inability to perform injection, paralysis, seizures, coma, no pain relief and death) who agreed to proceed.  Discussed the potential for sedation if warranted/wanted.  The procedure will plan to be performed at [facility] with fluoroscopic guidance(unless ultrasound is indicated) and could potentially have steroids and contrast dye used. Questions were answered and in a way the patient could understand.  Patient verbalized understanding and wishes to proceed.  This intervention will be ordered.  Discussed with patient that all procedures are part of a multimodal plan of care and include either formal PT or a home exercise program.  Patient has no evidence of coagulopathy or current infection.    --- Follow-up after procedure     Dictated utilizing Dragon dictation.

## 2025-06-10 ENCOUNTER — TRANSCRIBE ORDERS (OUTPATIENT)
Dept: SURGERY | Facility: SURGERY CENTER | Age: 60
End: 2025-06-10
Payer: COMMERCIAL

## 2025-06-10 ENCOUNTER — ANESTHESIA EVENT (OUTPATIENT)
Dept: GASTROENTEROLOGY | Facility: HOSPITAL | Age: 60
End: 2025-06-10
Payer: COMMERCIAL

## 2025-06-10 ENCOUNTER — ANESTHESIA (OUTPATIENT)
Dept: GASTROENTEROLOGY | Facility: HOSPITAL | Age: 60
End: 2025-06-10
Payer: COMMERCIAL

## 2025-06-10 ENCOUNTER — HOSPITAL ENCOUNTER (OUTPATIENT)
Facility: HOSPITAL | Age: 60
Setting detail: HOSPITAL OUTPATIENT SURGERY
Discharge: HOME | End: 2025-06-10
Attending: INTERNAL MEDICINE | Admitting: INTERNAL MEDICINE
Payer: COMMERCIAL

## 2025-06-10 VITALS
HEIGHT: 70 IN | OXYGEN SATURATION: 100 % | RESPIRATION RATE: 16 BRPM | BODY MASS INDEX: 40.37 KG/M2 | HEART RATE: 80 BPM | DIASTOLIC BLOOD PRESSURE: 79 MMHG | WEIGHT: 282 LBS | SYSTOLIC BLOOD PRESSURE: 125 MMHG

## 2025-06-10 DIAGNOSIS — Z12.11 ENCOUNTER FOR SCREENING FOR MALIGNANT NEOPLASM OF COLON: ICD-10-CM

## 2025-06-10 DIAGNOSIS — Z41.9 SURGERY, ELECTIVE: Primary | ICD-10-CM

## 2025-06-10 PROCEDURE — 25010000002 GLYCOPYRROLATE 0.2 MG/ML SOLUTION: Performed by: NURSE ANESTHETIST, CERTIFIED REGISTERED

## 2025-06-10 PROCEDURE — 25010000002 PROPOFOL 10 MG/ML EMULSION: Performed by: NURSE ANESTHETIST, CERTIFIED REGISTERED

## 2025-06-10 PROCEDURE — 25810000003 LACTATED RINGERS PER 1000 ML: Performed by: INTERNAL MEDICINE

## 2025-06-10 PROCEDURE — 25010000002 LIDOCAINE 2% SOLUTION: Performed by: NURSE ANESTHETIST, CERTIFIED REGISTERED

## 2025-06-10 RX ORDER — LIDOCAINE HYDROCHLORIDE 20 MG/ML
INJECTION, SOLUTION INFILTRATION; PERINEURAL AS NEEDED
Status: DISCONTINUED | OUTPATIENT
Start: 2025-06-10 | End: 2025-06-10 | Stop reason: SURG

## 2025-06-10 RX ORDER — GLYCOPYRROLATE 0.2 MG/ML
INJECTION INTRAMUSCULAR; INTRAVENOUS AS NEEDED
Status: DISCONTINUED | OUTPATIENT
Start: 2025-06-10 | End: 2025-06-10 | Stop reason: SURG

## 2025-06-10 RX ORDER — PROPOFOL 10 MG/ML
VIAL (ML) INTRAVENOUS AS NEEDED
Status: DISCONTINUED | OUTPATIENT
Start: 2025-06-10 | End: 2025-06-10 | Stop reason: SURG

## 2025-06-10 RX ORDER — SODIUM CHLORIDE, SODIUM LACTATE, POTASSIUM CHLORIDE, CALCIUM CHLORIDE 600; 310; 30; 20 MG/100ML; MG/100ML; MG/100ML; MG/100ML
30 INJECTION, SOLUTION INTRAVENOUS CONTINUOUS
Status: DISCONTINUED | OUTPATIENT
Start: 2025-06-10 | End: 2025-06-10 | Stop reason: HOSPADM

## 2025-06-10 RX ORDER — PROPOFOL 10 MG/ML
VIAL (ML) INTRAVENOUS AS NEEDED
Status: DISCONTINUED | OUTPATIENT
Start: 2025-06-10 | End: 2025-06-10

## 2025-06-10 RX ADMIN — PROPOFOL 100 MG: 10 INJECTION, EMULSION INTRAVENOUS at 08:44

## 2025-06-10 RX ADMIN — GLYCOPYRROLATE 0.2 MG: 0.2 INJECTION INTRAMUSCULAR; INTRAVENOUS at 08:41

## 2025-06-10 RX ADMIN — PROPOFOL 50 MG: 10 INJECTION, EMULSION INTRAVENOUS at 08:47

## 2025-06-10 RX ADMIN — LIDOCAINE HYDROCHLORIDE 100 MG: 20 INJECTION, SOLUTION INFILTRATION; PERINEURAL at 08:44

## 2025-06-10 RX ADMIN — PROPOFOL 200 MCG/KG/MIN: 10 INJECTION, EMULSION INTRAVENOUS at 08:44

## 2025-06-10 RX ADMIN — SODIUM CHLORIDE, POTASSIUM CHLORIDE, SODIUM LACTATE AND CALCIUM CHLORIDE 30 ML/HR: 600; 310; 30; 20 INJECTION, SOLUTION INTRAVENOUS at 08:09

## 2025-06-10 NOTE — ANESTHESIA POSTPROCEDURE EVALUATION
Patient: Ryley Heller    Procedure Summary       Date: 06/10/25 Room / Location:  [facility] /  [facility]    Anesthesia Start: 0841 Anesthesia Stop: 0906    Procedure: COLONOSCOPY TO CECUM Diagnosis:       Encounter for screening for malignant neoplasm of colon      (Encounter for screening for malignant neoplasm of colon [Z12.11])    Surgeons: Arthur Pittman MD Provider: Adair Sterling MD    Anesthesia Type: MAC ASA Status: 3            Anesthesia Type: MAC    Vitals  Vitals Value Taken Time   /79 06/10/25 09:26   Temp     Pulse 80 06/10/25 09:26   Resp 16 06/10/25 09:26   SpO2 100 % 06/10/25 09:26           Post Anesthesia Care and Evaluation    Patient location during evaluation: bedside  Patient participation: complete - patient participated  Level of consciousness: awake and alert  Pain management: adequate    Airway patency: patent  Anesthetic complications: No anesthetic complications  PONV Status: controlled  Cardiovascular status: blood pressure returned to baseline and acceptable  Respiratory status: acceptable  Hydration status: acceptable

## 2025-06-10 NOTE — H&P
"[facility]  Pre Procedure History & Physical    Chief Complaint:   Time for my colonoscopy    Subjective     HPI:   60 y.o. male presenting to endoscopy unit today for screening colonoscopy.    Past Medical History:   Past Medical History:   Diagnosis Date    Allergic forever    flonase and over the counter    Allergic rhinitis     Anemia     Arthritis 2000    Stems from back injury at work and surgery    BMI 50.0-59.9, adult     Chronic pain disorder 1999    Injuried back at [employer], have had surgery.  Improved till about 3 years ago and has continued to worsen    Elevated alkaline phosphatase level     Encounter for immunization     Fatigue     Fever     Gastritis     GERD (gastroesophageal reflux disease)     Hypertension     Hypogonadism male     Hypothyroidism     Low back pain 1999    See above notation    Lumbosacral disc disease 1999    Had surgery 2011    Morbid obesity     Pneumonia     Visit for annual health examination     Visual impairment     Wear glasses or contacts       Family History:  Family History   Adopted: Yes       Social History:   reports that he has never smoked. He has never used smokeless tobacco. He reports that he does not drink alcohol and does not use drugs.    Medications:   No medications prior to admission.       Allergies:  Morphine      Objective     Blood pressure 125/79, pulse 80, resp. rate 16, height 177.8 cm (70\"), weight 128 kg (282 lb), SpO2 100%.  Physical Exam:   General: patient awake, alert and cooperative    Assessment & Plan     Diagnosis:  Encounter for screening for colon cancer    Anticipated Surgical Procedure:  Colonoscopy    The risks, benefits, and alternatives of this procedure have been discussed with the patient or the responsible party- the patient understands and agrees to proceed.                                                                  "

## 2025-06-10 NOTE — DISCHARGE INSTRUCTIONS
For the next 24 hours patient needs to be with a responsible adult.    For 24 hours DO NOT drive, operate machinery, appliances, drink alcohol, make important decisions or sign legal documents.    Start with a light or bland diet if you are feeling sick to your stomach otherwise advance to regular diet as tolerated.    Follow recommendations on procedure report if provided by your doctor.    Call Dr Pittman for problems [phone]    Problems may include but not limited to: large amounts of bleeding, trouble breathing, repeated vomiting, severe unrelieved pain, fever or chills.       Skin normal color for race, warm, dry and intact. No evidence of rash.

## 2025-06-16 DIAGNOSIS — F51.01 PRIMARY INSOMNIA: ICD-10-CM

## 2025-06-26 DIAGNOSIS — I10 ESSENTIAL HYPERTENSION: ICD-10-CM

## 2025-06-26 RX ORDER — LOSARTAN POTASSIUM 100 MG/1
100 TABLET ORAL DAILY
Qty: 90 TABLET | Refills: 1 | Status: SHIPPED | OUTPATIENT
Start: 2025-06-26

## 2025-07-07 ENCOUNTER — TELEPHONE (OUTPATIENT)
Dept: PAIN MEDICINE | Facility: HOSPITAL | Age: 60
End: 2025-07-07
Payer: COMMERCIAL

## 2025-07-07 NOTE — DISCHARGE INSTRUCTIONS
[facility] - Post-procedure Instructions              --  While there are no absolute restrictions, it is recommended that you do not perform strenuous activity today. In the morning, you may resume your level of activity as before your block.    --  If you have a band-aid at your injection site, please remove it later today. Observe the area for any redness, swelling, pus-like drainage, or a temperature over 101°. If any of these symptoms occur, please call your doctor at [phone]. If after office hours, leave a message and the on-call provider will return your call.    --  Ice may be applied to your injection site. It is recommended you avoid direct heat (heating pad; hot tub) for 1-2 days.    --  Call [facility] at [phone] if you experience persistent headache, persistent bleeding from the injection site, or severe pain not relieved by heat or oral medication.    --  Do not make important decisions today.    --  Due to the effects of the block and/or the Sedation, DO NOT drive or operate hazardous machinery for 12 hours.  Local anesthetics may cause numbness after procedure and precautions must be taken with regards to operating equipment as well as with walking, even if ambulating with assistance of another person or with an assistive device.    --  Do not drink alcohol for 12 hours.    -- You may return to work tomorrow, or as directed by your referring doctor.    --  Occasionally you may notice a slight increase in your pain after the procedure. This should start to improve within the next 24-48 hours. Radiofrequency ablation procedure pain may last 3-4 weeks.    --  It may take as long as 3-4 days before you notice a gradual improvement in your pain and/or other symptoms.    -- You may continue to take your prescribed pain medication as needed.    --  Some normal possible side effects of steroid use could include fluid retention, increased blood sugar, dull headache, increased  sweating, increased appetite, mood swings and flushing.    --  Diabetics are recommended to watch their blood glucose level closely for 24-48 hours after the injection.    --  Must stay in PACU for 20 min upon arrival and prove no leg weakness before being discharged.    --  IN THE EVENT OF A LIFE THREATENING EMERGENCY, (CHEST PAIN, BREATHING DIFFICULTIES, PARALYSIS…) YOU SHOULD GO TO YOUR NEAREST EMERGENCY ROOM.    --  You should be contacted by our office within 2-3 days to schedule follow up or next appointment date (if not already scheduled).  If not contacted within 7 days, please call the office at [phone].     If you have even 1 hour of relief, these injections are considered successful.

## 2025-07-07 NOTE — TELEPHONE ENCOUNTER
Pre procedure pain injection phone call: Spoke to patient instructed on arrival time, location and to bring a

## 2025-07-08 ENCOUNTER — HOSPITAL ENCOUNTER (OUTPATIENT)
Dept: GENERAL RADIOLOGY | Facility: HOSPITAL | Age: 60
Discharge: HOME | End: 2025-07-08
Payer: COMMERCIAL

## 2025-07-08 ENCOUNTER — HOSPITAL ENCOUNTER (OUTPATIENT)
Dept: PAIN MEDICINE | Facility: HOSPITAL | Age: 60
Discharge: HOME | End: 2025-07-08
Payer: COMMERCIAL

## 2025-07-08 VITALS
TEMPERATURE: 97.7 F | HEART RATE: 74 BPM | DIASTOLIC BLOOD PRESSURE: 77 MMHG | WEIGHT: 265 LBS | OXYGEN SATURATION: 97 % | BODY MASS INDEX: 38.02 KG/M2 | RESPIRATION RATE: 18 BRPM | SYSTOLIC BLOOD PRESSURE: 116 MMHG

## 2025-07-08 DIAGNOSIS — R52 PAIN: ICD-10-CM

## 2025-07-08 PROCEDURE — 64493 INJ PARAVERT F JNT L/S 1 LEV: CPT | Performed by: ANESTHESIOLOGY

## 2025-07-08 PROCEDURE — 64494 INJ PARAVERT F JNT L/S 2 LEV: CPT | Performed by: ANESTHESIOLOGY

## 2025-07-08 PROCEDURE — 25010000002 LIDOCAINE PF 1% 1 % SOLUTION: Performed by: ANESTHESIOLOGY

## 2025-07-08 PROCEDURE — 25010000002 BUPIVACAINE (PF) 0.25 % SOLUTION: Performed by: ANESTHESIOLOGY

## 2025-07-08 PROCEDURE — C1755 CATHETER, INTRASPINAL: HCPCS

## 2025-07-08 PROCEDURE — 77002 NEEDLE LOCALIZATION BY XRAY: CPT

## 2025-07-08 RX ORDER — BUPIVACAINE HYDROCHLORIDE 2.5 MG/ML
10 INJECTION, SOLUTION EPIDURAL; INFILTRATION; INTRACAUDAL; PERINEURAL ONCE
Status: COMPLETED | OUTPATIENT
Start: 2025-07-08 | End: 2025-07-08

## 2025-07-08 RX ORDER — LIDOCAINE HYDROCHLORIDE 10 MG/ML
5 INJECTION, SOLUTION EPIDURAL; INFILTRATION; INTRACAUDAL; PERINEURAL ONCE
Status: COMPLETED | OUTPATIENT
Start: 2025-07-08 | End: 2025-07-08

## 2025-07-08 RX ADMIN — BUPIVACAINE HYDROCHLORIDE 10 ML: 2.5 INJECTION, SOLUTION EPIDURAL; INFILTRATION; INTRACAUDAL; PERINEURAL at 09:33

## 2025-07-08 RX ADMIN — LIDOCAINE HYDROCHLORIDE 5 ML: 10 INJECTION, SOLUTION EPIDURAL; INFILTRATION; INTRACAUDAL; PERINEURAL at 09:34

## 2025-07-08 NOTE — PROCEDURES
Procedures    Lumbar Medial Branch Blockade at  Bilateral L4-S1 (Only rare pain at L3 level, discussed and decided to do L4-S1)  [facility]      PREOPERATIVE DIAGNOSIS:  Lumbar spondylosis without myelopathy    POSTOPERATIVE DIAGNOSIS:  Lumbar spondylosis without myelopathy    PROCEDURE:   Diagnostic Lumbar Medial Branch Nerve Blockades, with fluoroscopy:  at the L4, L5 transverse processes and the sacral alar groove)   47142-53 -- Lumbar Facet blocks, 1st Level  00288-42 -- Lumbar Facet blocks, 2nd  Level     PRE-PROCEDURE DISCUSSION WITH PATIENT:    Risks and complications were discussed with the patient prior to starting the procedure and informed consent was obtained.      SURGEON:  Montserrat Carlson MD    REASON FOR PROCEDURE:    The patient complains of pain that seems to have a significant axial component and Tenderness of the affected facet joints on exam under fluoroscopy    SEDATION:  No sedation was used for this procedure  ANESTHETIC:  0.25% bupivacaine  STEROID:  None  TOTAL VOLUME OF SOLUTION:  6ml    DESCRIPTON OF PROCEDURE:  After obtaining informed consent, IV access was not obtained in the preoperative area.   The patient was taken to the operating room.  The patient was placed in the prone position with a pillow under the abdomen. All pressure points were well padded.  Heart rate, blood pressure, and pulse oximeter were monitored.  The patient was monitored and sedated by the RN under my direction. The lumbosacral area was prepped with Chloraprep and draped in a sterile fashion.     AP fluoroscopic image was used to visualize the junction of the right S1 superior articular process with the sacral ala.  The skin and subcutaneous tissue over the area was anesthetized with 1% lidocaine.  A 22-gauge spinal needle was then advanced percutaneously through the anesthetized skin tract under fluoroscopic guidance in a coaxial view to contact periosteum.  After negative aspiration, a volume of 1 mL  of the local anesthetic was injected without resistance.  The image was then optimized to maximize visualization of the junctions of the L4, L5 superior articular processes with the transverse processes.  The skin and subcutaneous tissue over the areas was anesthetized with 1% lidocaine.  A 22-gauge spinal needle was then advanced percutaneously through the anesthetized skin tracts under fluoroscopic guidance in a coaxial view to contact periosteum at the target sites.  After negative aspiration, a volume of 1 mL of the local anesthetic  was injected without resistance at each of the target sites.      The same procedure was then performed on the contralateral side in the exact same fashion.        Onset of analgesia was noted.  Vital signs remained stable throughout.      ESTIMATED BLOOD LOSS:  <5 mL  SPECIMENS:  none    COMPLICATIONS:   No complications were noted.    TOLERANCE & DISCHARGE CONDITION:    The patient tolerated the procedure well.  The patient was transported to the recovery area without difficulties.  The patient was discharged to home under the care of family in stable and satisfactory condition.    Pre-procedure pain score: 7/10 at rest, 10/10 with activity  Post-procedure pain score: 0/10    PLAN OF CARE:  The patient was given our standard instruction sheet.  We discussed that Lumbar Medial Branch Blockade is a diagnostic procedure in consideration for radiofrequency ablation if two diagnostic procedures prove to be positive for significant benefit.  An alternative plan could be therapeutic lumbar branch blockades.  The patient is asked to keep an account of pain relief after the procedure today.  The patient will  Return to clinic 1-2 wks.  The patient will resume all medications as per the medication reconciliation sheet.

## 2025-07-08 NOTE — H&P
Ryley Heller is a 60 y.o.  male who returns today for a scheduled procedure.  The previous clinic note has been reviewed.  There has been no change in the location or quality of the patient's pain.  The pain is currently rated as 7/10 in severity.  The patient is alert at the time of exam and is emotionally appropriate without significant debilities that would disqualify from the procedure.     Planned Procedure: Bilateral Medial branch blocks at ~L3-S1    Vitals:    07/08/25 0816   BP: 102/70   Pulse: 76   Resp: 16   Temp: 97.7 °F (36.5 °C)   SpO2: 94%       The risks and benefits of the procedure have been discussed with the patient who has elected to proceed.  There are no contraindications to the procedure at this time.  Appropriate consent forms have been signed.  All questions regarding the planned procedure have been addressed.  Please see the separate documentation for details of the interventional procedure.

## 2025-07-10 ENCOUNTER — TELEPHONE (OUTPATIENT)
Dept: PAIN MEDICINE | Facility: HOSPITAL | Age: 60
End: 2025-07-10
Payer: COMMERCIAL

## 2025-07-10 NOTE — TELEPHONE ENCOUNTER
Post procedure pain injection follow up: Spoke to patient who stated had relief for 4.5 hours post medial branch block and is very happy. Stated that sites are good with no issues

## 2025-07-15 ENCOUNTER — OFFICE VISIT (OUTPATIENT)
Dept: PAIN MEDICINE | Facility: CLINIC | Age: 60
End: 2025-07-15
Payer: COMMERCIAL

## 2025-07-15 ENCOUNTER — PREP FOR SURGERY (OUTPATIENT)
Dept: SURGERY | Facility: SURGERY CENTER | Age: 60
End: 2025-07-15
Payer: COMMERCIAL

## 2025-07-15 ENCOUNTER — TRANSCRIBE ORDERS (OUTPATIENT)
Dept: SURGERY | Facility: SURGERY CENTER | Age: 60
End: 2025-07-15
Payer: COMMERCIAL

## 2025-07-15 VITALS
OXYGEN SATURATION: 92 % | RESPIRATION RATE: 20 BRPM | HEART RATE: 80 BPM | TEMPERATURE: 97.1 F | BODY MASS INDEX: 40.49 KG/M2 | SYSTOLIC BLOOD PRESSURE: 133 MMHG | HEIGHT: 70 IN | WEIGHT: 282.8 LBS | DIASTOLIC BLOOD PRESSURE: 76 MMHG

## 2025-07-15 DIAGNOSIS — Z41.9 SURGERY, ELECTIVE: Primary | ICD-10-CM

## 2025-07-15 DIAGNOSIS — G89.29 CHRONIC MIDLINE LOW BACK PAIN WITHOUT SCIATICA: ICD-10-CM

## 2025-07-15 DIAGNOSIS — M47.816 LUMBAR FACET ARTHROPATHY: Primary | ICD-10-CM

## 2025-07-15 DIAGNOSIS — M54.50 CHRONIC MIDLINE LOW BACK PAIN WITHOUT SCIATICA: ICD-10-CM

## 2025-07-15 PROCEDURE — 99214 OFFICE O/P EST MOD 30 MIN: CPT | Performed by: NURSE PRACTITIONER

## 2025-07-15 RX ORDER — SODIUM CHLORIDE 0.9 % (FLUSH) 0.9 %
10 SYRINGE (ML) INJECTION EVERY 12 HOURS SCHEDULED
OUTPATIENT
Start: 2025-07-15

## 2025-07-15 RX ORDER — SODIUM CHLORIDE 0.9 % (FLUSH) 0.9 %
10 SYRINGE (ML) INJECTION AS NEEDED
OUTPATIENT
Start: 2025-07-15

## 2025-07-15 NOTE — PROGRESS NOTES
CHIEF COMPLAINT  Bilateral Medial branch blocks at L4-S1     Subjective   Ryley Heller is a 60 y.o. male  who presents to the office for follow-up of procedure.  He completed a Bilateral L4-S1 MBB   on  7/8/2025 performed by Dr. Carlson for management of back pain. Patient reports 90-95% relief from the procedure the day of the procedure. His pain fully returned that evening. He notes that while his pain was improved he was able to sweep and vacuum without pain. He notes that these activities normally cause significant pain.     Today his pain is 7/10VAS in severity.  His pain remains in his low back without any radicular complaints. His pain is worsened by standing, walking, bending, twisting, and laying down. He notes that standing and walking are the worst. He pain is improved slightly with heat.     Past pain medications:   Ibuprofen  Flexeril 5 mg (no relief)  Compounded pain cream (no relief)     Current pain medications:   Celebrex 200 mg daily  OTC Tylenol (unsure of dose)--using 2 tablets 2-3/day     Past therapies:  Physical Therapy: Yes, completed on 5/30/2025, no improvement  Chiropractor: None  Massage Therapy: None  TENS: Yes, temporary relief  Neck or back surgery: Lumbar discectomy on 2011 performed by Dr. Bernard   Past pain management: Yes, unsure of the name of the office.      Previous Injections:   LESI x 2 and what sounds like a Lumbar TFESI x 1 (all performed prior to surgery, no relief). He does note that his back pain is different at this time compared to his pain prior to surgery.     Back Pain  Chronicity:  Chronic  Onset:  More than 1 year ago  Frequency:  Constantly  Progression since onset:  Unchanged (unstable)  Pain location:  Lumbar spine and sacro-iliac  Pain quality:  Aching (throbbing)  Radiates to:  Does not radiate  Pain-numeric:  7/10  Pain severity:  Severe  Aggravated by:  Standing, bending, twisting, position, sitting and lying down (walking)  Associated symptoms: weakness  "(legs)    Associated symptoms: no abdominal pain, no chest pain, no fever, no headaches and no numbness    Treatments tried:  Heat and NSAIDs (Tylenol, TENS)     PEG Assessment   What number best describes your pain on average in the past week?7  What number best describes how, during the past week, pain has interfered with your enjoyment of life?7  What number best describes how, during the past week, pain has interfered with your general activity?  8    The following portions of the patient's history were reviewed and updated as appropriate: allergies, current medications, past family history, past medical history, past social history, past surgical history, and problem list.    Review of Systems   Constitutional:  Negative for activity change (less) and fever.   Cardiovascular:  Negative for chest pain.   Gastrointestinal:  Negative for abdominal pain, constipation and diarrhea.   Genitourinary:  Negative for difficulty urinating.   Musculoskeletal:  Positive for back pain.   Neurological:  Positive for weakness (legs). Negative for dizziness, numbness and headaches.   Psychiatric/Behavioral:  Negative for agitation, sleep disturbance and suicidal ideas. The patient is not nervous/anxious.      --  The aforementioned information the Chief Complaint section and above subjective data including any HPI data, and also the Review of Systems data, has been personally reviewed and affirmed.  --     Vitals:    07/15/25 0813   BP: 133/76   BP Location: Left arm   Patient Position: Sitting   Cuff Size: Adult   Pulse: 80   Resp: 20   Temp: 97.1 °F (36.2 °C)   TempSrc: Temporal   SpO2: 92%   Weight: 128 kg (282 lb 12.8 oz)   Height: 177.8 cm (70\")   PainSc: 7      Objective   Physical Exam  Vitals and nursing note reviewed.   Constitutional:       Appearance: Normal appearance. He is well-developed.   Eyes:      General: Lids are normal.   Pulmonary:      Effort: Pulmonary effort is normal.   Musculoskeletal:      Lumbar " back: Tenderness and bony tenderness present. Decreased range of motion.      Comments:   +Lumbar facet loading   Neurological:      Mental Status: He is alert and oriented to person, place, and time.   Psychiatric:         Attention and Perception: Attention normal.         Mood and Affect: Mood normal.         Speech: Speech normal.         Behavior: Behavior normal.         Judgment: Judgment normal.       Assessment & Plan   Diagnoses and all orders for this visit:    1. Lumbar facet arthropathy (Primary)    2. Chronic midline low back pain without sciatica      Ryley Heller reports a pain score of 7.  Given his pain assessment as noted, treatment options were discussed and the following options were decided upon as a follow-up plan to address the patient's pain: continuation of current treatment plan for pain.    Diagnostic Facet Joint Procedure:   AMARILYS     The confirmatory diagnostic facet joint procedure is considered medically reasonable and necessary for the diagnosis and treatment of chronic pain for this patient due to the patient meeting all of the following criteria:    - 1. Moderate to severe chronic neck or low back pain, predominantly axial, that causes functional deficit measured on pain or disability scale.  - 2. Pain present for minimum of 3 months with documented failure to respond to noninvasive conservative management (as tolerated)  - 3. Absence of untreated radiculopathy or neurogenic claudication (except for radiculopathy caused by facet joint synovial cyst)  - 4. There is no non-facet pathology per clinical assessment or radiology studies that could explain the source of the patient’s pain, including but not limited to fracture, tumor, infection, or significant deformity.    The patient has also shown a consistent positive response of at least 80% relief of primary (index) pain (with the duration of relief being consistent with the agent used).    --- Repeat Bilateral L4-S1 medial branch  blockade  Reviewed the procedure at length with the patient.  Included in the review was expectations, complications, risk and benefits.The procedure was described in detail and the risks, benefits and alternatives were discussed with the patient (including but not limited to: bleeding, infection, nerve damage, worsening of pain, inability to perform injection, paralysis, seizures, coma, no pain relief and death) who agreed to proceed.  Discussed the potential for sedation if warranted/wanted.  The procedure will plan to be performed at [facility] with fluoroscopic guidance(unless ultrasound is indicated) and could potentially have steroids and contrast dye used. Questions were answered and in a way the patient could understand.  Patient verbalized understanding and wishes to proceed.  This intervention will be ordered.  Discussed with patient that all procedures are part of a multimodal plan of care and include either formal PT or a home exercise program.  Patient has no evidence of coagulopathy or current infection.    --- Follow-up after procedure     Dictated utilizing Dragon dictation.

## 2025-07-27 DIAGNOSIS — E66.01 OBESITY, MORBID, BMI 50 OR HIGHER: ICD-10-CM

## 2025-07-27 DIAGNOSIS — E11.65 TYPE 2 DIABETES MELLITUS WITH HYPERGLYCEMIA, WITHOUT LONG-TERM CURRENT USE OF INSULIN: ICD-10-CM

## 2025-07-28 RX ORDER — SEMAGLUTIDE 2.68 MG/ML
INJECTION, SOLUTION SUBCUTANEOUS
Qty: 3 ML | Refills: 1 | Status: SHIPPED | OUTPATIENT
Start: 2025-07-28

## 2025-07-29 ENCOUNTER — PRIOR AUTHORIZATION (OUTPATIENT)
Dept: FAMILY MEDICINE CLINIC | Facility: CLINIC | Age: 60
End: 2025-07-29
Payer: COMMERCIAL

## 2025-07-29 NOTE — TELEPHONE ENCOUNTER
PA started 7/29/25    Key [id number]    Dx E11.65      Your PA request has been approved. Additional information will be provided in the approval communication. (Message [id number]). Authorization Expiration Date: July 29, 2026.

## 2025-08-04 ENCOUNTER — APPOINTMENT (OUTPATIENT)
Dept: GENERAL RADIOLOGY | Facility: SURGERY CENTER | Age: 60
End: 2025-08-04
Payer: COMMERCIAL

## 2025-08-05 DIAGNOSIS — I10 ESSENTIAL HYPERTENSION: ICD-10-CM

## 2025-08-05 RX ORDER — AMLODIPINE BESYLATE 10 MG/1
10 TABLET ORAL DAILY
Qty: 90 TABLET | Refills: 1 | Status: SHIPPED | OUTPATIENT
Start: 2025-08-05

## 2025-08-06 ENCOUNTER — HOSPITAL ENCOUNTER (OUTPATIENT)
Facility: SURGERY CENTER | Age: 60
Setting detail: HOSPITAL OUTPATIENT SURGERY
Discharge: HOME | End: 2025-08-06
Attending: ANESTHESIOLOGY | Admitting: ANESTHESIOLOGY
Payer: COMMERCIAL

## 2025-08-06 ENCOUNTER — HOSPITAL ENCOUNTER (OUTPATIENT)
Dept: GENERAL RADIOLOGY | Facility: SURGERY CENTER | Age: 60
End: 2025-08-06
Payer: COMMERCIAL

## 2025-08-06 VITALS
SYSTOLIC BLOOD PRESSURE: 121 MMHG | DIASTOLIC BLOOD PRESSURE: 82 MMHG | TEMPERATURE: 97.7 F | HEIGHT: 70 IN | HEART RATE: 76 BPM | WEIGHT: 282 LBS | RESPIRATION RATE: 16 BRPM | BODY MASS INDEX: 40.37 KG/M2 | OXYGEN SATURATION: 98 %

## 2025-08-06 DIAGNOSIS — Z41.9 SURGERY, ELECTIVE: ICD-10-CM

## 2025-08-06 PROCEDURE — 64493 INJ PARAVERT F JNT L/S 1 LEV: CPT | Performed by: ANESTHESIOLOGY

## 2025-08-06 PROCEDURE — 25010000002 LIDOCAINE PF 1% 1 % SOLUTION 5 ML VIAL: Performed by: ANESTHESIOLOGY

## 2025-08-06 PROCEDURE — 64494 INJ PARAVERT F JNT L/S 2 LEV: CPT | Performed by: ANESTHESIOLOGY

## 2025-08-06 PROCEDURE — 25010000002 BUPIVACAINE (PF) 0.25 % SOLUTION 10 ML VIAL: Performed by: ANESTHESIOLOGY

## 2025-08-06 PROCEDURE — 76000 FLUOROSCOPY <1 HR PHYS/QHP: CPT

## 2025-08-06 PROCEDURE — 77002 NEEDLE LOCALIZATION BY XRAY: CPT

## 2025-08-11 ENCOUNTER — OFFICE VISIT (OUTPATIENT)
Dept: PAIN MEDICINE | Facility: CLINIC | Age: 60
End: 2025-08-11
Payer: COMMERCIAL

## 2025-08-11 VITALS
WEIGHT: 284 LBS | DIASTOLIC BLOOD PRESSURE: 92 MMHG | RESPIRATION RATE: 18 BRPM | BODY MASS INDEX: 40.66 KG/M2 | HEART RATE: 64 BPM | OXYGEN SATURATION: 97 % | HEIGHT: 70 IN | TEMPERATURE: 98.6 F | SYSTOLIC BLOOD PRESSURE: 142 MMHG

## 2025-08-11 DIAGNOSIS — M54.50 CHRONIC MIDLINE LOW BACK PAIN WITHOUT SCIATICA: ICD-10-CM

## 2025-08-11 DIAGNOSIS — G89.29 CHRONIC MIDLINE LOW BACK PAIN WITHOUT SCIATICA: ICD-10-CM

## 2025-08-11 DIAGNOSIS — M47.816 LUMBAR FACET ARTHROPATHY: Primary | ICD-10-CM

## 2025-08-11 PROCEDURE — 99214 OFFICE O/P EST MOD 30 MIN: CPT | Performed by: NURSE PRACTITIONER

## (undated) DEVICE — LN SMPL CO2 SHTRM SD STREAM W/M LUER

## (undated) DEVICE — EPIDURAL TRAY: Brand: MEDLINE INDUSTRIES, INC.

## (undated) DEVICE — MSK PROC CURAPLEX O2 2/ADAPT 7FT

## (undated) DEVICE — ADAPT CLN BIOGUARD AIR/H2O DISP

## (undated) DEVICE — CANN O2 ETCO2 FITS ALL CONN CO2 SMPL A/ 7IN DISP LF

## (undated) DEVICE — GLV SURG TRIUMPH PF LTX 7.5 STRL

## (undated) DEVICE — KT ORCA ORCAPOD DISP STRL

## (undated) DEVICE — SENSR O2 OXIMAX FNGR A/ 18IN NONSTR

## (undated) DEVICE — TUBING, SUCTION, 1/4" X 10', STRAIGHT: Brand: MEDLINE

## (undated) DEVICE — NEEDLE, QUINCKE, 22GX5": Brand: MEDLINE